# Patient Record
Sex: MALE | Race: WHITE | NOT HISPANIC OR LATINO | Employment: STUDENT | URBAN - METROPOLITAN AREA
[De-identification: names, ages, dates, MRNs, and addresses within clinical notes are randomized per-mention and may not be internally consistent; named-entity substitution may affect disease eponyms.]

---

## 2017-07-20 ENCOUNTER — OFFICE VISIT (OUTPATIENT)
Dept: GASTROENTEROLOGY | Facility: CLINIC | Age: 22
End: 2017-07-20
Payer: COMMERCIAL

## 2017-07-20 ENCOUNTER — TELEPHONE (OUTPATIENT)
Dept: ENDOSCOPY | Facility: HOSPITAL | Age: 22
End: 2017-07-20

## 2017-07-20 VITALS
SYSTOLIC BLOOD PRESSURE: 129 MMHG | HEIGHT: 68 IN | BODY MASS INDEX: 40.03 KG/M2 | WEIGHT: 264.13 LBS | HEART RATE: 77 BPM | DIASTOLIC BLOOD PRESSURE: 85 MMHG

## 2017-07-20 DIAGNOSIS — Z12.11 SPECIAL SCREENING FOR MALIGNANT NEOPLASMS, COLON: Primary | ICD-10-CM

## 2017-07-20 DIAGNOSIS — R10.9 ABDOMINAL CRAMPING: ICD-10-CM

## 2017-07-20 DIAGNOSIS — R10.32 LEFT LOWER QUADRANT PAIN: Primary | ICD-10-CM

## 2017-07-20 DIAGNOSIS — K21.9 GASTROESOPHAGEAL REFLUX DISEASE, ESOPHAGITIS PRESENCE NOT SPECIFIED: ICD-10-CM

## 2017-07-20 DIAGNOSIS — Z79.1 NSAID LONG-TERM USE: ICD-10-CM

## 2017-07-20 DIAGNOSIS — R19.5 LOOSE STOOLS: ICD-10-CM

## 2017-07-20 PROCEDURE — 99204 OFFICE O/P NEW MOD 45 MIN: CPT | Mod: S$GLB,,, | Performed by: NURSE PRACTITIONER

## 2017-07-20 PROCEDURE — 99999 PR PBB SHADOW E&M-EST. PATIENT-LVL III: CPT | Mod: PBBFAC,,, | Performed by: NURSE PRACTITIONER

## 2017-07-20 RX ORDER — POLYETHYLENE GLYCOL 3350, SODIUM SULFATE ANHYDROUS, SODIUM BICARBONATE, SODIUM CHLORIDE, POTASSIUM CHLORIDE 236; 22.74; 6.74; 5.86; 2.97 G/4L; G/4L; G/4L; G/4L; G/4L
4 POWDER, FOR SOLUTION ORAL ONCE
Qty: 4000 ML | Refills: 0 | Status: SHIPPED | OUTPATIENT
Start: 2017-07-20 | End: 2017-07-20

## 2017-07-20 NOTE — PROGRESS NOTES
[facility] Note    Reason for Visit:  The primary encounter diagnosis was Left lower quadrant pain. Diagnoses of Abdominal cramping, Loose stools, Gastroesophageal reflux disease, esophagitis presence not specified, and NSAID long-term use were also pertinent to this visit.    PCP:   Ingrid Lemus       Referring MD:  Aaareferral Self  No address on file    HPI:  This is a 21 y.o. male here for evaluation of abdominal pain and loose stools. Mr. Mcclain is new to the clinic.      Abdominal pain-   ONSET: x 3 months  LOCATION: LLQ  DURATION: intermittent, multiple times per week  CHARACTER: cramping  ASSOCIATED/ALLEVIATING/AGGRAVAITING: No nausea, vomiting, fever. Occasional worse with gluten and artificial sweeteners (hx of drinking 2-3 red bulls per week stopped past 4 weeks), Occasional improvement with BM  RADIATION: hx of back pain unsure if radiates  TEMPORAL: No association with time. Can last all day.  SEVERITY: 6/10    Has been having 1-2 loose stools with a normal formed stool daily x 3 months. Began 3 weeks before finals. Left for [place] a few weeks after change in bowel habits and back 6/22/17 took Imodium in [place] and relief. Currently, still having loose stools with normal formed stool daily. Rectal burning and sensitivity after eating spicy foods and loose stools. Denies hemorrhoids, blood in stool, black tarry stools, fecal incontinence, fam hx of IBD.    Hx of Gerd x years. Reflux symptoms of pyrosis are occurring once a week. Food triggers- acidic foods, coffee. Will take Tums- relief. Denies dysphagia/odynophagia. NSAID usage- 2-3 tabs Ibuprofen 2x/week. No prior hx of EGD.     ROS:  Constitutional: No fevers, no chills, No unintentional weight loss, no fatigue   ENT: No allergies  CV: No chest pain, no palpitations, no perif. edema  Pulm: No cough, No shortness of breath, no wheezes, no sputum  Ophtho: No vision changes  GI: see HPI  Derm: No rash  Heme: No lymphadenopathy,  "No bruising  MSK: No arthritis, no muscle pain, no muscle weakness  : No dysuria, No hematuria  Endo: No hot or cold intolerance  Neuro: No syncope, No seizure     Medical History:  has no past medical history on file.    Surgical History:  has no past surgical history on file.    Family History: family history includes Diabetes in his maternal grandmother, maternal uncle, and mother; Diverticulosis in his father..     Social History:  reports that he has quit smoking. He has never used smokeless tobacco.    Review of patient's allergies indicates:  No Known Allergies    No current outpatient prescriptions on file.     No current facility-administered medications for this visit.      Objective Findings:    Vital Signs:  /85   Pulse 77   Ht 5' 8" (1.727 m)   Wt 119.8 kg (264 lb 1.8 oz)   BMI 40.16 kg/m²   Body mass index is 40.16 kg/m².    Physical Exam:  General Appearance: Well appearing in no acute distress  Head: Normocephalic, without obvious abnormality  Eyes: No scleral icterus, EOMI  ENT: Neck supple, Lips, mucosa, and tongue normal; teeth and gums normal  Lungs: CTA bilaterally in anterior and posterior fields, no wheezes, no crackles.  Heart: Regular rate and rhythm, no murmurs heard  Abdomen: Soft, LLQ tenderness upon palpation, no rebound tenderness, non distended with positive bowel sounds in all four quadrants.  Extremities: No clubbing, cyanosis or edema  Skin: No rash to exposed areas  Neurologic: AAOx4    Labs:  No results found for: WBC, HGB, HCT, PLT, CHOL, TRIG, HDL, LDLDIRECT, ALT, AST, NA, K, CL, CREATININE, BUN, CO2, TSH, PSA, INR, GLUF, HGBA1C, MICROALBUR    Endoscopy:    EGD- none  Colonoscopy- none    Assessment:  1. Left lower quadrant pain    2. Abdominal cramping    3. Loose stools    4. Gastroesophageal reflux disease, esophagitis presence not specified    5. NSAID long-term use      Recommendations:  1. Schedule Abd US to rule out biliary etiology. Ordered labs- Lipase rule " out pancreatitis, CBC, CMP.   2. & 3. Schedule colonoscopy to rule out colitis. Ordered labs- TTG Iga and Iga rule out Celiac Disease, H. Pylori. Ordered stool studies to rule out infectious stool international travel- Giardia x3, O&P x3, stool cx.   4. & 5. Schedule EGD to rule out ulcer and esophagitis. Can take OTC Zantac or Prilosec. Reflux occur greater than 2x per week notify clinic and will begin daily PPI.     Follow up after EGD and Colonoscopy.     Order summary:  Orders Placed This Encounter    Stool culture    US Abdomen Complete    Lipase    CBC auto differential    Comprehensive metabolic panel    H. PYLORI ANTIBODY, IGG    TISSUE TRANSGLUTAMINASE (TTG), IGA    IGA    Giardia / Cryptosporidum, EIA    Giardia / Cryptosporidum, EIA    Giardia / Cryptosporidum, EIA    Stool Exam-Ova,Cysts,Parasites    Stool Exam-Ova,Cysts,Parasites    Stool Exam-Ova,Cysts,Parasites    Case request GI: COLONOSCOPY, ESOPHAGOGASTRODUODENOSCOPY (EGD)     Thank you so much for allowing me to participate in the care of Anderson MayfieldVIVIAN Garay, FNP-C

## 2017-07-25 ENCOUNTER — TELEPHONE (OUTPATIENT)
Dept: GASTROENTEROLOGY | Facility: CLINIC | Age: 22
End: 2017-07-25

## 2017-07-25 NOTE — TELEPHONE ENCOUNTER
Called pt did not answer left voicemail regarding labs and to turn in stool studies when available. Left number to call back.     INGRIS Caba

## 2017-07-26 ENCOUNTER — TELEPHONE (OUTPATIENT)
Dept: RADIOLOGY | Facility: HOSPITAL | Age: 22
End: 2017-07-26

## 2017-08-03 ENCOUNTER — TELEPHONE (OUTPATIENT)
Dept: RADIOLOGY | Facility: HOSPITAL | Age: 22
End: 2017-08-03

## 2017-08-04 ENCOUNTER — HOSPITAL ENCOUNTER (OUTPATIENT)
Dept: RADIOLOGY | Facility: HOSPITAL | Age: 22
Discharge: HOME | End: 2017-08-04
Attending: NURSE PRACTITIONER
Payer: COMMERCIAL

## 2017-08-04 DIAGNOSIS — R10.32 LEFT LOWER QUADRANT PAIN: ICD-10-CM

## 2017-08-04 PROCEDURE — 76700 US EXAM ABDOM COMPLETE: CPT | Mod: TC,PO

## 2017-08-04 PROCEDURE — 76700 US EXAM ABDOM COMPLETE: CPT | Mod: 26,,, | Performed by: RADIOLOGY

## 2017-08-07 ENCOUNTER — TELEPHONE (OUTPATIENT)
Dept: GASTROENTEROLOGY | Facility: CLINIC | Age: 22
End: 2017-08-07

## 2017-08-07 ENCOUNTER — DOCUMENTATION ONLY (OUTPATIENT)
Dept: TRANSPLANT | Facility: CLINIC | Age: 22
End: 2017-08-07

## 2017-08-07 DIAGNOSIS — K76.0 FATTY LIVER: Primary | ICD-10-CM

## 2017-08-07 NOTE — LETTER
August 7, 2017    Bethany You, CHARY  [address] [facility] [address]      Dear Dr. You    Patient: Anderson Mcclain   MR Number: [id number]   YOB: 1995     Thank you for the referral of Anderson Mcclain to the [facility]. An initial appointment will be scheduled for your patient with one of our Hepatologists.      Thank you again for your trust in our program.  If there is anything we can do for you or your staff, please feel free to contact us.        Sincerely,        [facility]  [facility] [place] [address]  [phone]

## 2017-08-07 NOTE — LETTER
August 7, 2017    Anderson Mcclain  [address] [place] [id number]      Dear Anderson Mcclain:    Your doctor has referred you to the [facility]. You will be contacted by our office and an initial appointment will then be scheduled for you.    We look forward to seeing you soon. If you have any further questions, please contact us at [phone].       Sincerely,        [facility]   [address] [place] [id number]  [phone]

## 2017-08-07 NOTE — TELEPHONE ENCOUNTER
----- Message from Bethany You NP sent at 8/7/2017 10:42 AM CDT -----  Spoke with Mr. Mcclain's mother put in order for hepatology referral for fatty liver. Mother said will schedule on portal. Was not sure if anything had to be done on our end. Thanks.     CHARY Sims

## 2017-08-07 NOTE — TELEPHONE ENCOUNTER
Called Mr. Mcclain's cell phone and mother answered phone verified okay to release information to her at this time. Explained abd US revealed fatty liver and liver enlargement. Explained stools studies have not been collected. Mother understood all info.     DICK CabaC

## 2017-08-07 NOTE — NURSING
Pt records reviewed.   Pt will be referred to Hepatology.  Fatty liver  Initial referral received  from the workque.   Referring Provider/diagnosis  Bethany You NP  Referral letter sent to provider and patient.

## 2017-08-14 ENCOUNTER — ANESTHESIA (OUTPATIENT)
Dept: ENDOSCOPY | Facility: HOSPITAL | Age: 22
End: 2017-08-14
Payer: COMMERCIAL

## 2017-08-14 ENCOUNTER — TELEPHONE (OUTPATIENT)
Dept: GASTROENTEROLOGY | Facility: CLINIC | Age: 22
End: 2017-08-14

## 2017-08-14 ENCOUNTER — ANESTHESIA EVENT (OUTPATIENT)
Dept: ENDOSCOPY | Facility: HOSPITAL | Age: 22
End: 2017-08-14
Payer: COMMERCIAL

## 2017-08-14 ENCOUNTER — HOSPITAL ENCOUNTER (OUTPATIENT)
Facility: HOSPITAL | Age: 22
Discharge: HOME | End: 2017-08-14
Attending: INTERNAL MEDICINE | Admitting: INTERNAL MEDICINE
Payer: COMMERCIAL

## 2017-08-14 VITALS
DIASTOLIC BLOOD PRESSURE: 61 MMHG | HEART RATE: 61 BPM | OXYGEN SATURATION: 99 % | HEIGHT: 68 IN | RESPIRATION RATE: 16 BRPM | WEIGHT: 250 LBS | BODY MASS INDEX: 37.89 KG/M2 | SYSTOLIC BLOOD PRESSURE: 105 MMHG | RESPIRATION RATE: 16 BRPM | TEMPERATURE: 99 F

## 2017-08-14 DIAGNOSIS — R19.7 DIARRHEA: ICD-10-CM

## 2017-08-14 DIAGNOSIS — R19.7 DIARRHEA, UNSPECIFIED TYPE: Primary | ICD-10-CM

## 2017-08-14 PROCEDURE — 88365 INSITU HYBRIDIZATION (FISH): CPT | Mod: 26,,, | Performed by: PATHOLOGY

## 2017-08-14 PROCEDURE — 45380 COLONOSCOPY AND BIOPSY: CPT | Mod: ,,, | Performed by: INTERNAL MEDICINE

## 2017-08-14 PROCEDURE — 27201012 HC FORCEPS, HOT/COLD, DISP: Performed by: INTERNAL MEDICINE

## 2017-08-14 PROCEDURE — 88305 TISSUE EXAM BY PATHOLOGIST: CPT | Mod: 26,,, | Performed by: PATHOLOGY

## 2017-08-14 PROCEDURE — 88341 IMHCHEM/IMCYTCHM EA ADD ANTB: CPT | Mod: 26,,, | Performed by: PATHOLOGY

## 2017-08-14 PROCEDURE — 45380 COLONOSCOPY AND BIOPSY: CPT | Performed by: INTERNAL MEDICINE

## 2017-08-14 PROCEDURE — 37000009 HC ANESTHESIA EA ADD 15 MINS: Performed by: INTERNAL MEDICINE

## 2017-08-14 PROCEDURE — 88305 TISSUE EXAM BY PATHOLOGIST: CPT | Performed by: PATHOLOGY

## 2017-08-14 PROCEDURE — 37000008 HC ANESTHESIA 1ST 15 MINUTES: Performed by: INTERNAL MEDICINE

## 2017-08-14 PROCEDURE — 43239 EGD BIOPSY SINGLE/MULTIPLE: CPT | Mod: 51,,, | Performed by: INTERNAL MEDICINE

## 2017-08-14 PROCEDURE — 88342 IMHCHEM/IMCYTCHM 1ST ANTB: CPT | Mod: 26,,, | Performed by: PATHOLOGY

## 2017-08-14 PROCEDURE — D9220A PRA ANESTHESIA: Mod: ANES,,, | Performed by: ANESTHESIOLOGY

## 2017-08-14 PROCEDURE — 25000003 PHARM REV CODE 250: Performed by: NURSE ANESTHETIST, CERTIFIED REGISTERED

## 2017-08-14 PROCEDURE — 88364 INSITU HYBRIDIZATION (FISH): CPT | Mod: 26,,, | Performed by: PATHOLOGY

## 2017-08-14 PROCEDURE — 63600175 PHARM REV CODE 636 W HCPCS: Performed by: NURSE ANESTHETIST, CERTIFIED REGISTERED

## 2017-08-14 PROCEDURE — 43239 EGD BIOPSY SINGLE/MULTIPLE: CPT | Performed by: INTERNAL MEDICINE

## 2017-08-14 PROCEDURE — D9220A PRA ANESTHESIA: Mod: CRNA,,, | Performed by: NURSE ANESTHETIST, CERTIFIED REGISTERED

## 2017-08-14 RX ORDER — PROPOFOL 10 MG/ML
INJECTION, EMULSION INTRAVENOUS
Status: DISCONTINUED | OUTPATIENT
Start: 2017-08-14 | End: 2017-08-14

## 2017-08-14 RX ORDER — LIDOCAINE HCL/PF 100 MG/5ML
SYRINGE (ML) INTRAVENOUS
Status: DISCONTINUED | OUTPATIENT
Start: 2017-08-14 | End: 2017-08-14

## 2017-08-14 RX ORDER — KETAMINE HCL IN 0.9 % NACL 50 MG/5 ML
SYRINGE (ML) INTRAVENOUS
Status: DISCONTINUED | OUTPATIENT
Start: 2017-08-14 | End: 2017-08-14

## 2017-08-14 RX ORDER — SODIUM CHLORIDE 9 MG/ML
INJECTION, SOLUTION INTRAVENOUS CONTINUOUS PRN
Status: DISCONTINUED | OUTPATIENT
Start: 2017-08-14 | End: 2017-08-14

## 2017-08-14 RX ORDER — SODIUM CHLORIDE 9 MG/ML
INJECTION, SOLUTION INTRAVENOUS CONTINUOUS
Status: DISCONTINUED | OUTPATIENT
Start: 2017-08-14 | End: 2017-08-14 | Stop reason: HOSPADM

## 2017-08-14 RX ORDER — PROPOFOL 10 MG/ML
VIAL (ML) INTRAVENOUS CONTINUOUS PRN
Status: DISCONTINUED | OUTPATIENT
Start: 2017-08-14 | End: 2017-08-14

## 2017-08-14 RX ORDER — MIDAZOLAM HYDROCHLORIDE 1 MG/ML
INJECTION, SOLUTION INTRAMUSCULAR; INTRAVENOUS
Status: DISCONTINUED | OUTPATIENT
Start: 2017-08-14 | End: 2017-08-14

## 2017-08-14 RX ADMIN — MIDAZOLAM HYDROCHLORIDE 2 MG: 1 INJECTION, SOLUTION INTRAMUSCULAR; INTRAVENOUS at 09:08

## 2017-08-14 RX ADMIN — PROPOFOL 200 MCG/KG/MIN: 10 INJECTION, EMULSION INTRAVENOUS at 09:08

## 2017-08-14 RX ADMIN — LIDOCAINE HYDROCHLORIDE 100 MG: 20 INJECTION, SOLUTION INTRAVENOUS at 09:08

## 2017-08-14 RX ADMIN — PROPOFOL 140 MG: 10 INJECTION, EMULSION INTRAVENOUS at 09:08

## 2017-08-14 RX ADMIN — SODIUM CHLORIDE: 0.9 INJECTION, SOLUTION INTRAVENOUS at 09:08

## 2017-08-14 RX ADMIN — Medication 25 MG: at 09:08

## 2017-08-14 NOTE — PATIENT INSTRUCTIONS
Discharge Summary/Instructions after an Endoscopic Procedure  Patient Name: Anderson Mcclain  Patient MRN: [id number]  Patient YOB: 1995 Monday, August 14, 2017  Tate Sexton MD  RESTRICTIONS ON ACTIVITY:  - DO NOT drive a car, operate machinery, make legal/financial decisions, or   drink alcohol until the day after the procedure.    - The following day: return to full activity including work, except no heavy   lifting, straining or running for 3 days if polyps were removed.  - Diet: Eat and drink normally unless instructed otherwise.  TREATMENT FOR COMMON SIDE EFFECTS:  - Mild abdominal pain, bloating or excessive gas: rest, eat lightly and use   a heating pad.  - Sore Throat - treat with throat lozenges. Gargle with warm salt water.  SYMPTOMS TO WATCH FOR AND REPORT TO YOUR PHYSICIAN:  1. Severe abdominal pain or bloating.  2. Pain in chest.  3. Chills or fever occurring within 24 hours after a procedure.  4. A large amount of rectal bleeding, which would show as bright red,   maroon, or black stools. (A small amount of blood from the rectum is not   serious, especially if hemorrhoids are present.)  5. Because air was used during the procedure, expelling large amounts of air   from your rectum or belching is normal.  6. If a bowel prep was taken, you may not have a bowel movement for 1-3   days.  This is normal.  7. Go directly to the emergency room if you notice any of the following:   Chills and/or fever over 101 F   Persistent vomiting   Severe abdominal pain, other than gas cramps   Severe chest pain   Black, tarry stools   Any bleeding - exceeding one tablespoon  Your doctor recommends these additional instructions:  If any biopsies were performed, my office will call you in 5 to 6 business   days with any results.  You are being discharged to home.   Resume your previous diet.   Continue your present medications.   We are waiting for your pathology results.   Return to your referring  physician.   You have a contact number available for emergencies.  The signs and symptoms   of potential delayed complications were discussed with you.  You may return   to normal activities tomorrow.  Written discharge instructions were   provided to you.  For questions, problems or results please call your physician - Tate Sexton MD at Work:  [phone].  [facility], EMERGENCY ROOM PHONE NUMBER: [phone]  IF A COMPLICATION OR EMERGENCY SITUATION ARISES AND YOU ARE UNABLE TO REACH   YOUR PHYSICIAN - GO TO THE EMERGENCY ROOM.  Tate Sexton MD  8/14/2017 9:51:11 AM  This report has been verified and signed electronically.

## 2017-08-14 NOTE — TELEPHONE ENCOUNTER
----- Message from Bethany You NP sent at 8/14/2017 10:25 AM CDT -----  Please schedule follow up in 2 weeks. Thanks.     Behtany

## 2017-08-14 NOTE — H&P
History & Physical - Short Stay  Gastroenterology      SUBJECTIVE:     Procedure: Colonoscopy and EGD    Chief Complaint/Indication for Procedure: diarrhea and nausea    History of Present Illness:  Patient is a 21 y.o. male with multiple GI sxs including diarrhea, nausea, abdominal pain.    No prescriptions prior to admission.       Review of patient's allergies indicates:  No Known Allergies     History reviewed. No pertinent past medical history.  History reviewed. No pertinent surgical history.  Family History   Problem Relation Age of Onset    Diabetes Mother     Diabetes Maternal Uncle     Diabetes Maternal Grandmother     Diverticulosis Father     Celiac disease Neg Hx     Colon cancer Neg Hx     Colon polyps Neg Hx     Esophageal cancer Neg Hx     Inflammatory bowel disease Neg Hx     Irritable bowel syndrome Neg Hx     Stomach cancer Neg Hx      Social History   Substance Use Topics    Smoking status: Current Some Day Smoker     Types: Cigars    Smokeless tobacco: Never Used    Alcohol use 1.2 oz/week     2 Cans of beer per week       Review of Systems:  Constitutional: no fever or chills  Gastrointestinal: no nausea or vomiting, no abdominal pain or change in bowel habits    OBJECTIVE:     Vital Signs (Most Recent)  Temp: 97.9 °F (36.6 °C) (08/14/17 0805)  Pulse: 91 (08/14/17 0805)  Resp: 16 (08/14/17 0805)  BP: 121/70 (08/14/17 0805)  SpO2: 98 % (08/14/17 0805)    Physical Exam:  General: well developed, well nourished  Abdomen: soft, non-tender non-distented; bowel sounds normal; no masses,  no organomegaly         ASSESSMENT/PLAN:     Patient is a 21 y.o. male with multiple GI sxs including diarrhea, nausea, abdominal pain.    Plan: Colonoscopy and EGD    Anesthesia Plan: Moderate Sedation    ASA Grade: ASA 2 - Patient with mild systemic disease with no functional limitations

## 2017-08-14 NOTE — ANESTHESIA POSTPROCEDURE EVALUATION
"Anesthesia Post Evaluation    Patient: Anderson Mcclain    Procedure(s) Performed: Procedure(s) (LRB):  ESOPHAGOGASTRODUODENOSCOPY (EGD) (N/A)  COLONOSCOPY (N/A)    Final Anesthesia Type: general  Patient location during evaluation: PACU  Patient participation: Yes- Able to Participate  Level of consciousness: awake and alert  Post-procedure vital signs: reviewed and stable  Pain management: adequate  Airway patency: patent  PONV status at discharge: No PONV  Anesthetic complications: no      Cardiovascular status: hemodynamically stable  Respiratory status: unassisted  Hydration status: euvolemic  Follow-up not needed.        Visit Vitals  BP (!) 103/51 (BP Location: Left arm, Patient Position: Lying)   Pulse 71   Temp 37.1 °C (98.7 °F) (Axillary)   Resp 16   Ht 5' 8" (1.727 m)   Wt 113.4 kg (250 lb)   SpO2 (!) 94%   BMI 38.01 kg/m²       Pain/Tim Score: Pain Assessment Performed: Yes (8/14/2017 10:20 AM)  Presence of Pain: denies (8/14/2017 10:20 AM)  Tim Score: 8 (8/14/2017 10:20 AM)      "

## 2017-08-14 NOTE — PATIENT INSTRUCTIONS
Discharge Summary/Instructions after an Endoscopic Procedure  Patient Name: Anderson Mcclain  Patient MRN: [id number]  Patient YOB: 1995 Monday, August 14, 2017  Tate Sexton MD  RESTRICTIONS ON ACTIVITY:  - DO NOT drive a car, operate machinery, make legal/financial decisions, or   drink alcohol until the day after the procedure.    - The following day: return to full activity including work, except no heavy   lifting, straining or running for 3 days if polyps were removed.  - Diet: Eat and drink normally unless instructed otherwise.  TREATMENT FOR COMMON SIDE EFFECTS:  - Mild abdominal pain, bloating or excessive gas: rest, eat lightly and use   a heating pad.  - Sore Throat - treat with throat lozenges. Gargle with warm salt water.  SYMPTOMS TO WATCH FOR AND REPORT TO YOUR PHYSICIAN:  1. Severe abdominal pain or bloating.  2. Pain in chest.  3. Chills or fever occurring within 24 hours after a procedure.  4. A large amount of rectal bleeding, which would show as bright red,   maroon, or black stools. (A small amount of blood from the rectum is not   serious, especially if hemorrhoids are present.)  5. Because air was used during the procedure, expelling large amounts of air   from your rectum or belching is normal.  6. If a bowel prep was taken, you may not have a bowel movement for 1-3   days.  This is normal.  7. Go directly to the emergency room if you notice any of the following:   Chills and/or fever over 101 F   Persistent vomiting   Severe abdominal pain, other than gas cramps   Severe chest pain   Black, tarry stools   Any bleeding - exceeding one tablespoon  Your doctor recommends these additional instructions:  If any biopsies were performed, my office will call you in 5 to 6 business   days with any results.  You are being discharged to home.   Resume your previous diet.   Continue your present medications.   We are waiting for your pathology results.   Return to your referring  physician.   You have a contact number available for emergencies.  The signs and symptoms   of potential delayed complications were discussed with you.  You may return   to normal activities tomorrow.  Written discharge instructions were   provided to you.  For questions, problems or results please call your physician - Tate Sexton MD at Work:  [phone].  [facility], EMERGENCY ROOM PHONE NUMBER: [phone]  IF A COMPLICATION OR EMERGENCY SITUATION ARISES AND YOU ARE UNABLE TO REACH   YOUR PHYSICIAN - GO TO THE EMERGENCY ROOM.  Tate Sexton MD  8/14/2017 10:32:24 AM  This report has been verified and signed electronically.

## 2017-08-14 NOTE — TRANSFER OF CARE
"Anesthesia Transfer of Care Note    Patient: Anderson Mcclain    Procedure(s) Performed: Procedure(s) (LRB):  ESOPHAGOGASTRODUODENOSCOPY (EGD) (N/A)  COLONOSCOPY (N/A)    Patient location: PACU    Anesthesia Type: general    Transport from OR: Transported from OR on 2-3 L/min O2 by NC with adequate spontaneous ventilation    Post pain: adequate analgesia    Post assessment: no apparent anesthetic complications and tolerated procedure well    Post vital signs: stable    Level of consciousness: sedated and responds to stimulation    Nausea/Vomiting: no nausea/vomiting    Complications: none    Transfer of care protocol was followed      Last vitals:   Visit Vitals  /70 (BP Location: Left arm, Patient Position: Lying)   Pulse 91   Temp 36.6 °C (97.9 °F) (Oral)   Resp 16   Ht 5' 8" (1.727 m)   Wt 113.4 kg (250 lb)   SpO2 98%   BMI 38.01 kg/m²     "

## 2017-08-14 NOTE — DISCHARGE INSTRUCTIONS

## 2017-08-16 ENCOUNTER — OFFICE VISIT (OUTPATIENT)
Dept: HEPATOLOGY | Facility: CLINIC | Age: 22
End: 2017-08-16
Payer: COMMERCIAL

## 2017-08-16 ENCOUNTER — PROCEDURE VISIT (OUTPATIENT)
Dept: HEPATOLOGY | Facility: CLINIC | Age: 22
End: 2017-08-16
Attending: PHYSICIAN ASSISTANT
Payer: COMMERCIAL

## 2017-08-16 VITALS
OXYGEN SATURATION: 97 % | HEART RATE: 72 BPM | BODY MASS INDEX: 38.92 KG/M2 | HEIGHT: 68 IN | RESPIRATION RATE: 18 BRPM | SYSTOLIC BLOOD PRESSURE: 132 MMHG | TEMPERATURE: 98 F | DIASTOLIC BLOOD PRESSURE: 79 MMHG | WEIGHT: 256.81 LBS

## 2017-08-16 DIAGNOSIS — K76.0 HEPATIC STEATOSIS: Primary | ICD-10-CM

## 2017-08-16 DIAGNOSIS — K76.0 HEPATIC STEATOSIS: ICD-10-CM

## 2017-08-16 PROCEDURE — 3008F BODY MASS INDEX DOCD: CPT | Mod: S$GLB,,, | Performed by: PHYSICIAN ASSISTANT

## 2017-08-16 PROCEDURE — 99999 PR PBB SHADOW E&M-EST. PATIENT-LVL III: CPT | Mod: PBBFAC,,, | Performed by: PHYSICIAN ASSISTANT

## 2017-08-16 PROCEDURE — 91200 LIVER ELASTOGRAPHY: CPT | Mod: S$GLB,,, | Performed by: PHYSICIAN ASSISTANT

## 2017-08-16 PROCEDURE — 99204 OFFICE O/P NEW MOD 45 MIN: CPT | Mod: S$GLB,,, | Performed by: PHYSICIAN ASSISTANT

## 2017-08-16 NOTE — PROGRESS NOTES
HEPATOLOGY CLINIC VISIT NOTE     REFERRING PROVIDER: Bethany You NP    REASON FOR VISIT: fatty liver    HISTORY: This is a 21 y.o. White male here for evaluation of fatty liver noted during his LLQ pain work up. U/S revealed hepatosplenomegaly and hepatic steatosis. Labs reveal normal transaminases, normal LFTs, PLTs are >150. He has not had formal liver evaluation. He reports that his mother has NAFLD as well. He does not have any significant PMH. He recently had EGD/colonoscopy done- path is pending. He denies heavy alcohol use. His BMI is 39, he does endorse a diet high in process foods. Denies jaundice, dark urine, hematemesis, melena, slowed mentation, abdominal distention.       Liver staging:  No formal staging  Normal transaminases  Normal liver function                   No past medical history on file.      Past Surgical History:   Procedure Laterality Date    COLONOSCOPY N/A 8/14/2017    Procedure: COLONOSCOPY;  Surgeon: Tate Sexton MD;  Location: [address]);  Service: Endoscopy;  Laterality: N/A;     FAMILY HISTORY:   Mother with NAFLD    SOCIAL HISTORY:   History   Smoking Status    Current Some Day Smoker    Types: Cigars   Smokeless Tobacco    Never Used       History   Alcohol Use    1.2 oz/week    2 Cans of beer per week       History   Drug Use No       ROS:   No fever, chills, weight loss, fatigue  No chest pain, cough  No abdominal pain, nausea, vomiting  No lower extremity edema  No depression or anxiety      PHYSICAL EXAM:  Friendly White male, in no acute distress; alert and oriented to person, place and time  VITALS: reviewed  HEENT: Sclerae anicteric.   NECK: Supple  CVS: Regular rate and rhythm. No murmurs  LUNGS: Normal respiratory effort. Clear bilaterally  ABDOMEN: Flat, soft, nontender. No organomegaly or masses. No ascites or hernias. Good bowel sounds.    SKIN: Warm and dry. No jaundice, No obvious rashes.   EXTREMITIES: No lower extremity  edema  NEURO/PSYCH: Normal gate. Memory intact. Thought and speech pattern appropriate. Behavior normal. No depression or anxiety noted.    RECENT LABS:  Lab Results   Component Value Date    WBC 9.07 07/20/2017    HGB 13.8 (L) 07/20/2017     07/20/2017     No results found for: INR  Lab Results   Component Value Date    AST 20 07/20/2017    ALT 22 07/20/2017    BILITOT 0.8 07/20/2017    ALBUMIN 3.8 07/20/2017    ALKPHOS 57 07/20/2017    CREATININE 1.0 07/20/2017    BUN 14 07/20/2017     07/20/2017    K 4.2 07/20/2017       RECENT IMAGING:  U/S abdomen 08/2017  Narrative     Complete abdominal ultrasound.    Findings: The liver is enlarged measuring 17.7 cm in craniocaudal length.  There is mild coarsening and increased echogenicity of the hepatic parenchyma suggesting fatty infiltration.  No focal liver lesions are identified.    Gallbladder is unremarkable without intraluminal stones or sludge identified.  Common bile duct is normal in caliber measuring 2.6 mm.    The spleen is enlarged measuring 14.7 cm in length.    Pancreas is unremarkable.    Kidneys are unremarkable.    Abdominal aorta and IVC are unremarkable.    No ascites.   Impression     1.  Hepatosplenomegaly.    2.  Fatty liver.       ASSESSMENT  21 y.o. White male with:  1. HEPATIC STEATOSIS  -- normal transaminases, given this, I do not feel a full serological work up is needed  -- will assess fibrosis with fibroscan given hepatosplenomegaly   -- discussed dietary changes and weight loss  _____________  Post visit fibroscan noted no fibrosis, will follow up PRN.     EDUCATION:  We discussed the manifestations of NAFLD. At this time there is no FDA approved therapy for NAFLD.   The patient and I discussed the importance of diet, exercise, and weight loss for management of NAFLD. We discussed a low fat, low carb/low sugar, high fiber diet, and a goal of 30 minutes of exercise 5 times per week.   Pt is aware that fatty liver can progress to  steatohepatitis and possibly to cirrhosis, putting one at increased risk for liver cancer, liver failure, and death.       PLAN:  1. Fibroscan  2. Follow up PRN    Thank you for allowing me to participate in the care of Anderson Mcclain  Prince Herr PA-C

## 2017-08-16 NOTE — LETTER
August 16, 2017      Bethany You, NP  [address] [facility] [address]           [facility] - Hepatology  [address]  [facility] [address]  Phone: [phone]  Fax: [phone]          Patient: Anderson Mcclain   MR Number: [id number]   YOB: 1995   Date of Visit: 8/16/2017       Dear Bethany You:    Thank you for referring Anderson Mcclain to me for evaluation. Attached you will find relevant portions of my assessment and plan of care.    If you have questions, please do not hesitate to call me. I look forward to following Anderson Mcclain along with you.    Sincerely,    Prince Herr PA-C    Enclosure  CC:  No Recipients    If you would like to receive this communication electronically, please contact [email or web address] or [phone] to request more information on [employer] access.    For providers and/or their staff who would like to refer a patient to [facility], please contact us through our one-stop-shop provider referral line, [facility], at [phone].    If you feel you have received this communication in error or would no longer like to receive these types of communications, please e-mail [email or web address]

## 2017-08-16 NOTE — PROGRESS NOTES
I have reviewed and concur with the ARNIE's history, physical, assessment, and plan.  I have personally interviewed and examined the patient at bedside.  See below addendum for my evaluation and additional findings.    22yo male with incidentally noted hepatosplenomegaly and normal liver enzymes.  Suspect patient with benign NAFLD given obesity.  Discussed importance of healthy lifestyle and increased physical activity.  Patient reports mother with fatty liver disease.  Recommend fibroscan to rule out advanced fibrosis given the presence of splenomegaly but if normal patient does not require long term hepatology follow-up and should continue lifestyle changes with annual check of liver tests.     Patient will return to clinic prn

## 2017-08-17 NOTE — PROCEDURES
Procedures   Fibroscan Procedure     Name: Anderson Mcclain  Date of Procedure : 2017   :: Prince Herr PA-C  Diagnosis: NAFLD    Probe: XL    Fibroscan reading: 3.8 KPa    Fibrosis:F 0-1     CAP readin dB/m    Steatosis: :S3

## 2017-08-21 ENCOUNTER — TELEPHONE (OUTPATIENT)
Dept: ENDOSCOPY | Facility: HOSPITAL | Age: 22
End: 2017-08-21

## 2017-08-25 ENCOUNTER — TELEPHONE (OUTPATIENT)
Dept: GASTROENTEROLOGY | Facility: CLINIC | Age: 22
End: 2017-08-25

## 2017-08-25 NOTE — TELEPHONE ENCOUNTER
Result Notes     Notes Recorded by Tate Sexton MD on 8/24/2017 at 11:52 AM CDT  Mild inflammation of stomach and esophagus without infection so no antibiotics are needed.   The terminal ilium biopsies shows lymphoid tissue which is benign as discussed with the patient after the procedure.   No need for f/up endoscopy at this point.     Patient informed.

## 2018-05-02 ENCOUNTER — LAB VISIT (OUTPATIENT)
Dept: LAB | Facility: HOSPITAL | Age: 23
End: 2018-05-02
Payer: COMMERCIAL

## 2018-05-02 ENCOUNTER — OFFICE VISIT (OUTPATIENT)
Dept: FAMILY MEDICINE | Facility: CLINIC | Age: 23
End: 2018-05-02
Payer: COMMERCIAL

## 2018-05-02 VITALS
DIASTOLIC BLOOD PRESSURE: 84 MMHG | SYSTOLIC BLOOD PRESSURE: 122 MMHG | TEMPERATURE: 99 F | HEART RATE: 99 BPM | HEIGHT: 68 IN | RESPIRATION RATE: 18 BRPM | OXYGEN SATURATION: 98 % | WEIGHT: 255.63 LBS | BODY MASS INDEX: 38.74 KG/M2

## 2018-05-02 DIAGNOSIS — Z00.00 ROUTINE ADULT HEALTH MAINTENANCE: ICD-10-CM

## 2018-05-02 DIAGNOSIS — Z00.00 ROUTINE ADULT HEALTH MAINTENANCE: Primary | ICD-10-CM

## 2018-05-02 DIAGNOSIS — M54.6 LEFT-SIDED THORACIC BACK PAIN, UNSPECIFIED CHRONICITY: ICD-10-CM

## 2018-05-02 DIAGNOSIS — M54.2 NECK PAIN: ICD-10-CM

## 2018-05-02 DIAGNOSIS — F41.9 ANXIETY: ICD-10-CM

## 2018-05-02 DIAGNOSIS — R25.3 EYELID TWITCH: ICD-10-CM

## 2018-05-02 DIAGNOSIS — R51.9 NONINTRACTABLE HEADACHE, UNSPECIFIED CHRONICITY PATTERN, UNSPECIFIED HEADACHE TYPE: ICD-10-CM

## 2018-05-02 LAB
ALBUMIN SERPL BCP-MCNC: 4 G/DL
ALP SERPL-CCNC: 63 U/L
ALT SERPL W/O P-5'-P-CCNC: 19 U/L
ANION GAP SERPL CALC-SCNC: 9 MMOL/L
AST SERPL-CCNC: 18 U/L
BASOPHILS # BLD AUTO: 0.07 K/UL
BASOPHILS NFR BLD: 0.7 %
BILIRUB SERPL-MCNC: 0.3 MG/DL
BUN SERPL-MCNC: 14 MG/DL
CALCIUM SERPL-MCNC: 9.9 MG/DL
CHLORIDE SERPL-SCNC: 105 MMOL/L
CHOLEST SERPL-MCNC: 180 MG/DL
CHOLEST/HDLC SERPL: 3.8 {RATIO}
CO2 SERPL-SCNC: 26 MMOL/L
CREAT SERPL-MCNC: 0.9 MG/DL
DIFFERENTIAL METHOD: ABNORMAL
EOSINOPHIL # BLD AUTO: 0.2 K/UL
EOSINOPHIL NFR BLD: 2.1 %
ERYTHROCYTE [DISTWIDTH] IN BLOOD BY AUTOMATED COUNT: 13.5 %
EST. GFR  (AFRICAN AMERICAN): >60 ML/MIN/1.73 M^2
EST. GFR  (NON AFRICAN AMERICAN): >60 ML/MIN/1.73 M^2
ESTIMATED AVG GLUCOSE: 103 MG/DL
GLUCOSE SERPL-MCNC: 82 MG/DL
HBA1C MFR BLD HPLC: 5.2 %
HCT VFR BLD AUTO: 46.2 %
HDLC SERPL-MCNC: 47 MG/DL
HDLC SERPL: 26.1 %
HGB BLD-MCNC: 14.5 G/DL
IMM GRANULOCYTES # BLD AUTO: 0.05 K/UL
IMM GRANULOCYTES NFR BLD AUTO: 0.5 %
LDLC SERPL CALC-MCNC: 101 MG/DL
LYMPHOCYTES # BLD AUTO: 3.1 K/UL
LYMPHOCYTES NFR BLD: 32.1 %
MCH RBC QN AUTO: 28.3 PG
MCHC RBC AUTO-ENTMCNC: 31.4 G/DL
MCV RBC AUTO: 90 FL
MONOCYTES # BLD AUTO: 0.7 K/UL
MONOCYTES NFR BLD: 7 %
NEUTROPHILS # BLD AUTO: 5.6 K/UL
NEUTROPHILS NFR BLD: 57.6 %
NONHDLC SERPL-MCNC: 133 MG/DL
NRBC BLD-RTO: 0 /100 WBC
PLATELET # BLD AUTO: 298 K/UL
PMV BLD AUTO: 11.3 FL
POTASSIUM SERPL-SCNC: 4.7 MMOL/L
PROT SERPL-MCNC: 7.7 G/DL
RBC # BLD AUTO: 5.12 M/UL
SODIUM SERPL-SCNC: 140 MMOL/L
TRIGL SERPL-MCNC: 160 MG/DL
TSH SERPL DL<=0.005 MIU/L-ACNC: 1.75 UIU/ML
WBC # BLD AUTO: 9.72 K/UL

## 2018-05-02 PROCEDURE — 84443 ASSAY THYROID STIM HORMONE: CPT

## 2018-05-02 PROCEDURE — 83036 HEMOGLOBIN GLYCOSYLATED A1C: CPT

## 2018-05-02 PROCEDURE — 85025 COMPLETE CBC W/AUTO DIFF WBC: CPT

## 2018-05-02 PROCEDURE — 80061 LIPID PANEL: CPT

## 2018-05-02 PROCEDURE — 36415 COLL VENOUS BLD VENIPUNCTURE: CPT | Mod: PO

## 2018-05-02 PROCEDURE — 80053 COMPREHEN METABOLIC PANEL: CPT

## 2018-05-02 PROCEDURE — 99999 PR PBB SHADOW E&M-EST. PATIENT-LVL IV: CPT | Mod: PBBFAC,,, | Performed by: INTERNAL MEDICINE

## 2018-05-02 PROCEDURE — 99385 PREV VISIT NEW AGE 18-39: CPT | Mod: S$GLB,,, | Performed by: INTERNAL MEDICINE

## 2018-05-02 RX ORDER — MELOXICAM 15 MG/1
15 TABLET ORAL DAILY
Qty: 30 TABLET | Refills: 0 | Status: SHIPPED | OUTPATIENT
Start: 2018-05-02 | End: 2021-05-17

## 2018-05-02 NOTE — PROGRESS NOTES
Subjective:       Patient ID: Anderson Mcclain is a 22 y.o. male.    Chief Complaint: Establish Care    -est care-------      History reviewed. No pertinent past medical history.  Past Surgical History:   Procedure Laterality Date    COLONOSCOPY N/A 8/14/2017    Procedure: COLONOSCOPY;  Surgeon: Tate Sexton MD;  Location: [address];  Service: Endoscopy;  Laterality: N/A;    WISDOM TOOTH EXTRACTION       Family History   Problem Relation Age of Onset    Diabetes Mother     Diabetes Maternal Uncle     Diabetes Maternal Grandmother     Diverticulosis Father     Celiac disease Neg Hx     Colon cancer Neg Hx     Colon polyps Neg Hx     Esophageal cancer Neg Hx     Inflammatory bowel disease Neg Hx     Irritable bowel syndrome Neg Hx     Stomach cancer Neg Hx      Social History     Social History    Marital status: Single     Spouse name: N/A    Number of children: N/A    Years of education: N/A     Occupational History    Not on file.     Social History Main Topics    Smoking status: Current Some Day Smoker     Types: Cigars    Smokeless tobacco: Never Used    Alcohol use 1.2 oz/week     2 Cans of beer per week    Drug use: No    Sexual activity: Not on file     Other Topics Concern    Not on file     Social History Narrative    No narrative on file     Review of Systems   Constitutional: Negative for activity change, appetite change, chills, diaphoresis, fatigue, fever and unexpected weight change.   HENT: Negative for drooling, ear discharge, ear pain, facial swelling, hearing loss, mouth sores, nosebleeds, postnasal drip, rhinorrhea, sinus pressure, sneezing, sore throat, tinnitus, trouble swallowing and voice change.    Eyes: Negative for photophobia, redness and visual disturbance.        Eyelid twitching.   Respiratory: Negative for apnea, cough, choking, chest tightness, shortness of breath and wheezing.    Cardiovascular: Negative for chest pain, palpitations and leg swelling.    Gastrointestinal: Negative for abdominal distention, abdominal pain, anal bleeding, blood in stool, constipation, diarrhea, nausea and vomiting.   Endocrine: Negative for cold intolerance, heat intolerance, polydipsia, polyphagia and polyuria.   Genitourinary: Negative for difficulty urinating, dysuria, enuresis, flank pain, frequency, genital sores, hematuria and urgency.   Musculoskeletal: Positive for back pain, neck pain and neck stiffness. Negative for arthralgias, gait problem, joint swelling and myalgias.   Skin: Negative for color change, pallor, rash and wound.   Allergic/Immunologic: Negative for food allergies and immunocompromised state.   Neurological: Positive for headaches. Negative for dizziness, tremors, seizures, syncope, facial asymmetry, speech difficulty, weakness, light-headedness and numbness.   Hematological: Negative for adenopathy. Does not bruise/bleed easily.   Psychiatric/Behavioral: Negative for agitation, behavioral problems, confusion, decreased concentration, dysphoric mood, hallucinations, self-injury, sleep disturbance and suicidal ideas. The patient is nervous/anxious. The patient is not hyperactive.        Objective:      Physical Exam   Constitutional: He is oriented to person, place, and time. He appears well-developed and well-nourished. No distress.   HENT:   Head: Normocephalic and atraumatic.   Eyes: Pupils are equal, round, and reactive to light.   Neck: Normal range of motion. Neck supple. No JVD present. Carotid bruit is not present. No tracheal deviation present. No thyromegaly present.   Cardiovascular: Normal rate, regular rhythm, normal heart sounds and intact distal pulses.    Pulmonary/Chest: Effort normal and breath sounds normal. No respiratory distress. He has no wheezes. He has no rales. He exhibits no tenderness.   Abdominal: Soft. Bowel sounds are normal. He exhibits no distension. There is no tenderness. There is no rebound and no guarding.    Musculoskeletal: Normal range of motion. He exhibits tenderness. He exhibits no edema.   Lymphadenopathy:     He has no cervical adenopathy.   Neurological: He is alert and oriented to person, place, and time. No cranial nerve deficit. Coordination normal.   Skin: Skin is warm and dry. No rash noted. He is not diaphoretic. No erythema. No pallor.   Psychiatric: He has a normal mood and affect. His behavior is normal. Judgment and thought content normal.   Nursing note and vitals reviewed.      CMP  Sodium   Date Value Ref Range Status   07/20/2017 136 136 - 145 mmol/L Final     Potassium   Date Value Ref Range Status   07/20/2017 4.2 3.5 - 5.1 mmol/L Final     Chloride   Date Value Ref Range Status   07/20/2017 102 95 - 110 mmol/L Final     CO2   Date Value Ref Range Status   07/20/2017 27 23 - 29 mmol/L Final     Glucose   Date Value Ref Range Status   07/20/2017 96 70 - 110 mg/dL Final     BUN, Bld   Date Value Ref Range Status   07/20/2017 14 6 - 20 mg/dL Final     Creatinine   Date Value Ref Range Status   07/20/2017 1.0 0.5 - 1.4 mg/dL Final     Calcium   Date Value Ref Range Status   07/20/2017 9.4 8.7 - 10.5 mg/dL Final     Total Protein   Date Value Ref Range Status   07/20/2017 7.5 6.0 - 8.4 g/dL Final     Albumin   Date Value Ref Range Status   07/20/2017 3.8 3.5 - 5.2 g/dL Final     Total Bilirubin   Date Value Ref Range Status   07/20/2017 0.8 0.1 - 1.0 mg/dL Final     Comment:     For infants and newborns, interpretation of results should be based  on gestational age, weight and in agreement with clinical  observations.  Premature Infant recommended reference ranges:  Up to 24 hours.............<8.0 mg/dL  Up to 48 hours............<12.0 mg/dL  3-5 days..................<15.0 mg/dL  6-29 days.................<15.0 mg/dL       Alkaline Phosphatase   Date Value Ref Range Status   07/20/2017 57 55 - 135 U/L Final     AST   Date Value Ref Range Status   07/20/2017 20 10 - 40 U/L Final     ALT   Date Value  Ref Range Status   07/20/2017 22 10 - 44 U/L Final     Anion Gap   Date Value Ref Range Status   07/20/2017 7 (L) 8 - 16 mmol/L Final     eGFR if    Date Value Ref Range Status   07/20/2017 >60.0 >60 mL/min/1.73 m^2 Final     eGFR if non    Date Value Ref Range Status   07/20/2017 >60.0 >60 mL/min/1.73 m^2 Final     Comment:     Calculation used to obtain the estimated glomerular filtration  rate (eGFR) is the CKD-EPI equation. Since race is unknown   in our information system, the eGFR values for   -American and Non--American patients are given   for each creatinine result.       Lab Results   Component Value Date    WBC 9.07 07/20/2017    HGB 13.8 (L) 07/20/2017    HCT 42.0 07/20/2017    MCV 85 07/20/2017     07/20/2017     No results found for: CHOL  No results found for: HDL  No results found for: LDLCALC  No results found for: TRIG  No results found for: CHOLHDL  No results found for: TSH  No results found for: LABA1C, HGBA1C  Assessment:       1. Routine adult health maintenance    2. Left-sided thoracic back pain, unspecified chronicity    3. Neck pain    4. Nonintractable headache, unspecified chronicity pattern, unspecified headache type    5. Anxiety    6. Eyelid twitch        Plan:   Routine adult health maintenance  -     Comprehensive metabolic panel; Future; Expected date: 05/02/2018  -     CBC auto differential; Future; Expected date: 05/02/2018  -     Lipid panel; Future; Expected date: 05/02/2018  -     TSH; Future; Expected date: 05/02/2018  -     Hemoglobin A1c; Future; Expected date: 05/02/2018    Left-sided thoracic back pain, unspecified chronicity  -     meloxicam (MOBIC) 15 MG tablet; Take 1 tablet (15 mg total) by mouth once daily.  Dispense: 30 tablet; Refill: 0    Neck pain  -     meloxicam (MOBIC) 15 MG tablet; Take 1 tablet (15 mg total) by mouth once daily.  Dispense: 30 tablet; Refill: 0    Nonintractable headache, unspecified  chronicity pattern, unspecified headache type  -     Ambulatory referral to Neurology  -     meloxicam (MOBIC) 15 MG tablet; Take 1 tablet (15 mg total) by mouth once daily.  Dispense: 30 tablet; Refill: 0    Anxiety    Eyelid twitch  -     Ambulatory referral to Neurology                F/u 6 months.

## 2018-05-14 ENCOUNTER — LAB VISIT (OUTPATIENT)
Dept: LAB | Facility: HOSPITAL | Age: 23
End: 2018-05-14
Attending: PSYCHIATRY & NEUROLOGY
Payer: COMMERCIAL

## 2018-05-14 ENCOUNTER — OFFICE VISIT (OUTPATIENT)
Dept: NEUROLOGY | Facility: CLINIC | Age: 23
End: 2018-05-14
Payer: COMMERCIAL

## 2018-05-14 VITALS
BODY MASS INDEX: 38.86 KG/M2 | SYSTOLIC BLOOD PRESSURE: 118 MMHG | DIASTOLIC BLOOD PRESSURE: 74 MMHG | HEIGHT: 68 IN | WEIGHT: 256.38 LBS | HEART RATE: 79 BPM

## 2018-05-14 DIAGNOSIS — R29.818 NEUROLOGICAL DEFICIT PRESENT: ICD-10-CM

## 2018-05-14 DIAGNOSIS — R51.9 NONINTRACTABLE HEADACHE, UNSPECIFIED CHRONICITY PATTERN, UNSPECIFIED HEADACHE TYPE: ICD-10-CM

## 2018-05-14 DIAGNOSIS — R51.9 NONINTRACTABLE HEADACHE, UNSPECIFIED CHRONICITY PATTERN, UNSPECIFIED HEADACHE TYPE: Primary | ICD-10-CM

## 2018-05-14 LAB
25(OH)D3+25(OH)D2 SERPL-MCNC: 18 NG/ML
VIT B12 SERPL-MCNC: 624 PG/ML

## 2018-05-14 PROCEDURE — 84425 ASSAY OF VITAMIN B-1: CPT

## 2018-05-14 PROCEDURE — 84252 ASSAY OF VITAMIN B-2: CPT

## 2018-05-14 PROCEDURE — 36415 COLL VENOUS BLD VENIPUNCTURE: CPT

## 2018-05-14 PROCEDURE — 99205 OFFICE O/P NEW HI 60 MIN: CPT | Mod: S$GLB,,, | Performed by: PSYCHIATRY & NEUROLOGY

## 2018-05-14 PROCEDURE — 82306 VITAMIN D 25 HYDROXY: CPT

## 2018-05-14 PROCEDURE — 84207 ASSAY OF VITAMIN B-6: CPT

## 2018-05-14 PROCEDURE — 99999 PR PBB SHADOW E&M-EST. PATIENT-LVL III: CPT | Mod: PBBFAC,,, | Performed by: PSYCHIATRY & NEUROLOGY

## 2018-05-14 PROCEDURE — 3008F BODY MASS INDEX DOCD: CPT | Mod: CPTII,S$GLB,, | Performed by: PSYCHIATRY & NEUROLOGY

## 2018-05-14 PROCEDURE — 82607 VITAMIN B-12: CPT

## 2018-05-14 NOTE — PROGRESS NOTES
[facility] - NEUROLOGY  [facility] [place]    Date: May 14, 2018   Patient Name: Anderson Mcclain   MRN: [id number]   PCP: Abel Bland  Referring Provider: Self, Aaareferral    Assessment:      This is Andreson Mcclain, 22 y.o. male with new headache and unilateral sensory alteration.  We discussed that many of symptoms may be related to underlying stressors although this should be diagnosis of exclusion.     Plan:      -  MRI brain with and without  -  Vitamin levels  -  Reviewed sleep hygeine    Will call with results       I discussed side effects of the medications. I asked the patient to stop the medication if she notices serious adverse effects as we discussed and to seek immediate medical attention at an ER.     Jong Lawler MD  [facility]   Department of Neurology    Subjective:      HPI:   Mr. Anderson Mcclain is a 22 y.o. male who presents with several issues    Patient developed mid back pain about 2015 which gradually progressed down into the low back and later into the hip, he saw PMR and was diagnosed with piriformis syndrome.  Somewhat after development of back pain he began to have left distal leg numbness and paresthesias which gradually progressed to left arm and more recently to left face.  He describes 1-2 tension headaches per week with some recent worsening related to college finals, also has intermittent sinus headaches and seasonal allergies.  He notes intermittent left eye watering and drooping eyelid as well as left eyelid twitching that does not occur in association with headache.  Endorses some difficulty with balance and mild difficulty with left hand fine motor control.    He just completed his second year at [employer] with a major in .  He notes recent poor sleep and lack of exercise with recent stressor of father passing away 12/2017 and returning to school shortly after.  Currently enrolled in summer courses.    PAST MEDICAL  "HISTORY:  No past medical history on file.    PAST SURGICAL HISTORY:  Past Surgical History:   Procedure Laterality Date    COLONOSCOPY N/A 8/14/2017    Procedure: COLONOSCOPY;  Surgeon: Tate Sexton MD;  Location: [address];  Service: Endoscopy;  Laterality: N/A;    WISDOM TOOTH EXTRACTION       CURRENT MEDS:  Current Outpatient Prescriptions   Medication Sig Dispense Refill    meloxicam (MOBIC) 15 MG tablet Take 1 tablet (15 mg total) by mouth once daily. 30 tablet 0     No current facility-administered medications for this visit.      ALLERGIES:  Review of patient's allergies indicates:  No Known Allergies    FAMILY HISTORY:  Family History   Problem Relation Age of Onset    Diabetes Mother     Diabetes Maternal Uncle     Diabetes Maternal Grandmother     Diverticulosis Father     Celiac disease Neg Hx     Colon cancer Neg Hx     Colon polyps Neg Hx     Esophageal cancer Neg Hx     Inflammatory bowel disease Neg Hx     Irritable bowel syndrome Neg Hx     Stomach cancer Neg Hx        SOCIAL HISTORY:  Social History   Substance Use Topics    Smoking status: Current Some Day Smoker     Types: Cigars    Smokeless tobacco: Never Used    Alcohol use 1.2 oz/week     2 Cans of beer per week       Review of Systems:  12 review of systems is negative except for the symptoms mentioned in HPI.        Objective:     Vitals:    05/14/18 1412   BP: 118/74   Pulse: 79   Weight: 116.3 kg (256 lb 6.3 oz)   Height: 5' 7.5" (1.715 m)       General: NAD, well nourished   Eyes: no tearing, discharge, no erythema   ENT: moist mucous membranes of the oral cavity, nares patent    Neck: Supple, full range of motion  Cardiovascular: Warm and well perfused, pulses equal and symmetrical  Lungs: Normal work of breathing, normal chest wall excursions  Skin: No rash, lesions, or breakdown on exposed skin  Psychiatry: Mood and affect are appropriate   Abdomen: soft, non tender, non distended  Extremeties: No " cyanosis, clubbing or edema.    Neurological   MENTAL STATUS: Alert and oriented to person, place, and time. Attention and concentration within normal limits. Speech without dysarthria, able to name and repeat without difficulty. Recent and remote memory within normal limits   CRANIAL NERVES: Visual fields intact. PERRL. EOMI. Facial sensation intact. Face symmetrical. Hearing grossly intact. Full shoulder shrug bilaterally. Tongue protrudes midline   SENSORY: Sensation is intact to light touch and vibration throughout.  Negative Romberg.   MOTOR: Normal bulk and tone. No pronator drift.  5/5 deltoid, biceps, triceps, interosseous, hand  bilaterally. 5/5 iliopsoas, knee extension/flexion, foot dorsi/plantarflexion bilaterally.    REFLEXES: Symmetric and 2+ throughout. Toes down going bilaterally.   CEREBELLAR/COORDINATION/GAIT: Gait steady with normal arm swing and stride length.  Heel to shin intact. Finger to nose intact. Normal rapid alternating movements.

## 2018-05-15 DIAGNOSIS — E55.9 VITAMIN D DEFICIENCY: Primary | ICD-10-CM

## 2018-05-15 RX ORDER — ERGOCALCIFEROL 1.25 MG/1
50000 CAPSULE ORAL
Qty: 12 CAPSULE | Refills: 3 | Status: SHIPPED | OUTPATIENT
Start: 2018-05-15 | End: 2021-05-17

## 2018-05-16 LAB — VIT B1 SERPL-MCNC: 59 UG/L (ref 38–122)

## 2018-05-17 LAB — PYRIDOXAL SERPL-MCNC: 24 UG/L (ref 5–50)

## 2018-05-18 LAB — VIT B2 SERPL-MCNC: 8 MCG/L (ref 1–19)

## 2018-05-23 ENCOUNTER — HOSPITAL ENCOUNTER (OUTPATIENT)
Dept: RADIOLOGY | Facility: HOSPITAL | Age: 23
Discharge: HOME | End: 2018-05-23
Attending: PSYCHIATRY & NEUROLOGY
Payer: COMMERCIAL

## 2018-05-23 DIAGNOSIS — R29.818 NEUROLOGICAL DEFICIT PRESENT: ICD-10-CM

## 2018-05-23 DIAGNOSIS — R51.9 NONINTRACTABLE HEADACHE, UNSPECIFIED CHRONICITY PATTERN, UNSPECIFIED HEADACHE TYPE: ICD-10-CM

## 2018-05-23 PROCEDURE — 70553 MRI BRAIN STEM W/O & W/DYE: CPT | Mod: 26,,, | Performed by: RADIOLOGY

## 2018-05-23 PROCEDURE — A9585 GADOBUTROL INJECTION: HCPCS | Performed by: PSYCHIATRY & NEUROLOGY

## 2018-05-23 PROCEDURE — 70553 MRI BRAIN STEM W/O & W/DYE: CPT | Mod: TC

## 2018-05-23 PROCEDURE — 25500020 PHARM REV CODE 255: Performed by: PSYCHIATRY & NEUROLOGY

## 2018-05-23 RX ORDER — GADOBUTROL 604.72 MG/ML
10 INJECTION INTRAVENOUS
Status: COMPLETED | OUTPATIENT
Start: 2018-05-23 | End: 2018-05-23

## 2018-05-23 RX ADMIN — GADOBUTROL 10 ML: 604.72 INJECTION INTRAVENOUS at 02:05

## 2019-04-03 PROCEDURE — 99284 EMERGENCY DEPT VISIT MOD MDM: CPT | Mod: 25

## 2019-04-03 PROCEDURE — 96372 THER/PROPH/DIAG INJ SC/IM: CPT

## 2019-04-04 ENCOUNTER — HOSPITAL ENCOUNTER (EMERGENCY)
Facility: HOSPITAL | Age: 24
Discharge: HOME | End: 2019-04-04
Attending: EMERGENCY MEDICINE
Payer: COMMERCIAL

## 2019-04-04 VITALS
TEMPERATURE: 98 F | DIASTOLIC BLOOD PRESSURE: 79 MMHG | HEART RATE: 78 BPM | RESPIRATION RATE: 16 BRPM | BODY MASS INDEX: 41.24 KG/M2 | HEIGHT: 69 IN | SYSTOLIC BLOOD PRESSURE: 137 MMHG | WEIGHT: 278.44 LBS | OXYGEN SATURATION: 99 %

## 2019-04-04 DIAGNOSIS — G89.29 ACUTE EXACERBATION OF CHRONIC LOW BACK PAIN: ICD-10-CM

## 2019-04-04 DIAGNOSIS — S13.4XXA WHIPLASH INJURY TO NECK, INITIAL ENCOUNTER: ICD-10-CM
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
## 2019-04-04 DIAGNOSIS — M54.50 ACUTE EXACERBATION OF CHRONIC LOW BACK PAIN: ICD-10-CM

## 2019-04-04 PROCEDURE — 63600175 PHARM REV CODE 636 W HCPCS: Performed by: NURSE PRACTITIONER

## 2019-04-04 RX ORDER — DICLOFENAC SODIUM 50 MG/1
50 TABLET, DELAYED RELEASE ORAL 3 TIMES DAILY PRN
Qty: 15 TABLET | Refills: 0 | Status: SHIPPED | OUTPATIENT
Start: 2019-04-04 | End: 2021-05-17

## 2019-04-04 RX ORDER — KETOROLAC TROMETHAMINE 30 MG/ML
60 INJECTION, SOLUTION INTRAMUSCULAR; INTRAVENOUS
Status: COMPLETED | OUTPATIENT
Start: 2019-04-04 | End: 2019-04-04

## 2019-04-04 RX ORDER — ORPHENADRINE CITRATE 100 MG/1
100 TABLET, EXTENDED RELEASE ORAL 2 TIMES DAILY PRN
Qty: 20 TABLET | Refills: 0 | Status: SHIPPED | OUTPATIENT
Start: 2019-04-04 | End: 2019-04-14

## 2019-04-04 RX ADMIN — KETOROLAC TROMETHAMINE 60 MG: 30 INJECTION, SOLUTION INTRAMUSCULAR; INTRAVENOUS at 01:04
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
## 2020-07-08 NOTE — ANESTHESIA PREPROCEDURE EVALUATION
08/14/2017  Anderson Mcclain is a 21 y.o., male.    Anesthesia Evaluation         Review of Systems      Physical Exam  General:  Well nourished    Airway/Jaw/Neck:  Airway Findings: Mouth Opening: Normal Tongue: Normal  General Airway Assessment: Adult  Mallampati: II  Improves to II with phonation.  TM Distance: Normal, at least 6 cm      Dental:  Dental Findings: In tact   Chest/Lungs:  Chest/Lungs Findings: Clear to auscultation     Heart/Vascular:  Heart Findings: Rate: Normal  Rhythm: Regular Rhythm  Sounds: Normal        Mental Status:  Mental Status Findings:  Cooperative, Alert and Oriented         Anesthesia Plan  Type of Anesthesia, risks & benefits discussed:  Anesthesia Type:  general  Patient's Preference: General  Intra-op Monitoring Plan: standard ASA monitors  Intra-op Monitoring Plan Comments: Standard ASA monitors.   Post Op Pain Control Plan: per primary service following discharge from PACU  Post Op Pain Control Plan Comments: Per primary service.     Induction:   IV  Beta Blocker:  Patient is not currently on a Beta-Blocker (No further documentation required).       Informed Consent: Patient understands risks and agrees with Anesthesia plan.  Questions answered. Anesthesia consent signed with patient.  ASA Score: 2     Day of Surgery Review of History & Physical:    H&P update referred to the surgeon.     Anesthesia Plan Notes: Chart reviewed, patient interviewed and examined.  The plan for general anesthesia was explained.  Questions were answered and the consent was signed.  Amelie IRWIN         Ready For Surgery From Anesthesia Perspective.        - likely ACD  -Hb dropped 8.2 to 7.3  -f/u Hb at 2pm  -stool guiac  -trend H/H  -transfuse below 7  - f/u TIBC, iron sat

## 2020-10-06 ENCOUNTER — PATIENT MESSAGE (OUTPATIENT)
Dept: ADMINISTRATIVE | Facility: HOSPITAL | Age: 25
End: 2020-10-06

## 2021-02-02 ENCOUNTER — TELEPHONE (OUTPATIENT)
Dept: PSYCHIATRY | Facility: CLINIC | Age: 26
End: 2021-02-02

## 2021-03-25 ENCOUNTER — PATIENT MESSAGE (OUTPATIENT)
Dept: ADMINISTRATIVE | Facility: HOSPITAL | Age: 26
End: 2021-03-25

## 2021-05-17 ENCOUNTER — LAB VISIT (OUTPATIENT)
Dept: LAB | Facility: HOSPITAL | Age: 26
End: 2021-05-17
Attending: INTERNAL MEDICINE
Payer: COMMERCIAL

## 2021-05-17 ENCOUNTER — OFFICE VISIT (OUTPATIENT)
Dept: PULMONOLOGY | Facility: CLINIC | Age: 26
End: 2021-05-17
Payer: COMMERCIAL

## 2021-05-17 ENCOUNTER — OFFICE VISIT (OUTPATIENT)
Dept: GASTROENTEROLOGY | Facility: CLINIC | Age: 26
End: 2021-05-17
Payer: COMMERCIAL

## 2021-05-17 VITALS
BODY MASS INDEX: 46.65 KG/M2 | DIASTOLIC BLOOD PRESSURE: 88 MMHG | HEART RATE: 102 BPM | HEIGHT: 69 IN | WEIGHT: 315 LBS | OXYGEN SATURATION: 97 % | SYSTOLIC BLOOD PRESSURE: 137 MMHG

## 2021-05-17 VITALS
HEART RATE: 83 BPM | DIASTOLIC BLOOD PRESSURE: 70 MMHG | BODY MASS INDEX: 46.65 KG/M2 | WEIGHT: 315 LBS | HEIGHT: 69 IN | SYSTOLIC BLOOD PRESSURE: 120 MMHG

## 2021-05-17 DIAGNOSIS — E66.01 CLASS 3 SEVERE OBESITY DUE TO EXCESS CALORIES WITH SERIOUS COMORBIDITY AND BODY MASS INDEX (BMI) OF 45.0 TO 49.9 IN ADULT: ICD-10-CM

## 2021-05-17 DIAGNOSIS — G47.33 OSA (OBSTRUCTIVE SLEEP APNEA): Primary | ICD-10-CM

## 2021-05-17 DIAGNOSIS — G47.00 INSOMNIA, UNSPECIFIED TYPE: ICD-10-CM

## 2021-05-17 DIAGNOSIS — K21.9 GASTROESOPHAGEAL REFLUX DISEASE, UNSPECIFIED WHETHER ESOPHAGITIS PRESENT: ICD-10-CM

## 2021-05-17 DIAGNOSIS — R10.32 LEFT LOWER QUADRANT PAIN: Primary | ICD-10-CM

## 2021-05-17 DIAGNOSIS — R19.7 DIARRHEA, UNSPECIFIED TYPE: ICD-10-CM

## 2021-05-17 DIAGNOSIS — G47.8 SLEEP PARALYSIS: ICD-10-CM

## 2021-05-17 DIAGNOSIS — R10.32 LEFT LOWER QUADRANT PAIN: ICD-10-CM

## 2021-05-17 DIAGNOSIS — K76.0 FATTY LIVER: Primary | ICD-10-CM

## 2021-05-17 PROBLEM — E66.813 CLASS 3 SEVERE OBESITY DUE TO EXCESS CALORIES WITH SERIOUS COMORBIDITY AND BODY MASS INDEX (BMI) OF 45.0 TO 49.9 IN ADULT: Status: ACTIVE | Noted: 2021-05-17

## 2021-05-17 LAB
BASOPHILS # BLD AUTO: 0.06 K/UL (ref 0–0.2)
BASOPHILS NFR BLD: 0.6 % (ref 0–1.9)
DIFFERENTIAL METHOD: ABNORMAL
EOSINOPHIL # BLD AUTO: 0.2 K/UL (ref 0–0.5)
EOSINOPHIL NFR BLD: 2.4 % (ref 0–8)
ERYTHROCYTE [DISTWIDTH] IN BLOOD BY AUTOMATED COUNT: 13.7 % (ref 11.5–14.5)
HCT VFR BLD AUTO: 42.7 % (ref 40–54)
HGB BLD-MCNC: 13.5 G/DL (ref 14–18)
IMM GRANULOCYTES # BLD AUTO: 0.03 K/UL (ref 0–0.04)
IMM GRANULOCYTES NFR BLD AUTO: 0.3 % (ref 0–0.5)
LYMPHOCYTES # BLD AUTO: 3.3 K/UL (ref 1–4.8)
LYMPHOCYTES NFR BLD: 34.3 % (ref 18–48)
MCH RBC QN AUTO: 27.6 PG (ref 27–31)
MCHC RBC AUTO-ENTMCNC: 31.6 G/DL (ref 32–36)
MCV RBC AUTO: 87 FL (ref 82–98)
MONOCYTES # BLD AUTO: 0.6 K/UL (ref 0.3–1)
MONOCYTES NFR BLD: 6.5 % (ref 4–15)
NEUTROPHILS # BLD AUTO: 5.4 K/UL (ref 1.8–7.7)
NEUTROPHILS NFR BLD: 55.9 % (ref 38–73)
NRBC BLD-RTO: 0 /100 WBC
PLATELET # BLD AUTO: 317 K/UL (ref 150–450)
PMV BLD AUTO: 10.9 FL (ref 9.2–12.9)
RBC # BLD AUTO: 4.89 M/UL (ref 4.6–6.2)
WBC # BLD AUTO: 9.61 K/UL (ref 3.9–12.7)

## 2021-05-17 PROCEDURE — 1126F AMNT PAIN NOTED NONE PRSNT: CPT | Mod: S$GLB,,, | Performed by: INTERNAL MEDICINE

## 2021-05-17 PROCEDURE — 99999 PR PBB SHADOW E&M-EST. PATIENT-LVL III: ICD-10-PCS | Mod: PBBFAC,,, | Performed by: INTERNAL MEDICINE

## 2021-05-17 PROCEDURE — 85025 COMPLETE CBC W/AUTO DIFF WBC: CPT | Performed by: INTERNAL MEDICINE

## 2021-05-17 PROCEDURE — 36415 COLL VENOUS BLD VENIPUNCTURE: CPT | Performed by: INTERNAL MEDICINE

## 2021-05-17 PROCEDURE — 3008F PR BODY MASS INDEX (BMI) DOCUMENTED: ICD-10-PCS | Mod: CPTII,S$GLB,, | Performed by: INTERNAL MEDICINE

## 2021-05-17 PROCEDURE — 99204 PR OFFICE/OUTPT VISIT, NEW, LEVL IV, 45-59 MIN: ICD-10-PCS | Mod: S$GLB,,, | Performed by: INTERNAL MEDICINE

## 2021-05-17 PROCEDURE — 1126F PR PAIN SEVERITY QUANTIFIED, NO PAIN PRESENT: ICD-10-PCS | Mod: S$GLB,,, | Performed by: INTERNAL MEDICINE

## 2021-05-17 PROCEDURE — 3008F BODY MASS INDEX DOCD: CPT | Mod: CPTII,S$GLB,, | Performed by: INTERNAL MEDICINE

## 2021-05-17 PROCEDURE — 99204 OFFICE O/P NEW MOD 45 MIN: CPT | Mod: S$GLB,,, | Performed by: INTERNAL MEDICINE

## 2021-05-17 PROCEDURE — 1125F AMNT PAIN NOTED PAIN PRSNT: CPT | Mod: S$GLB,,, | Performed by: INTERNAL MEDICINE

## 2021-05-17 PROCEDURE — 80053 COMPREHEN METABOLIC PANEL: CPT | Performed by: INTERNAL MEDICINE

## 2021-05-17 PROCEDURE — 99999 PR PBB SHADOW E&M-EST. PATIENT-LVL III: CPT | Mod: PBBFAC,,, | Performed by: INTERNAL MEDICINE

## 2021-05-17 PROCEDURE — 1125F PR PAIN SEVERITY QUANTIFIED, PAIN PRESENT: ICD-10-PCS | Mod: S$GLB,,, | Performed by: INTERNAL MEDICINE

## 2021-05-17 RX ORDER — SODIUM, POTASSIUM,MAG SULFATES 17.5-3.13G
1 SOLUTION, RECONSTITUTED, ORAL ORAL ONCE
Qty: 1 BOTTLE | Refills: 0 | Status: SHIPPED | OUTPATIENT
Start: 2021-05-17 | End: 2021-05-17

## 2021-05-17 RX ORDER — CEFDINIR 300 MG/1
300 CAPSULE ORAL 2 TIMES DAILY
COMMUNITY
Start: 2021-05-10 | End: 2021-05-27 | Stop reason: CLARIF

## 2021-05-18 LAB
ALBUMIN SERPL BCP-MCNC: 4 G/DL (ref 3.5–5.2)
ALP SERPL-CCNC: 51 U/L (ref 55–135)
ALT SERPL W/O P-5'-P-CCNC: 36 U/L (ref 10–44)
ANION GAP SERPL CALC-SCNC: 11 MMOL/L (ref 8–16)
AST SERPL-CCNC: 23 U/L (ref 10–40)
BILIRUB SERPL-MCNC: 0.3 MG/DL (ref 0.1–1)
BUN SERPL-MCNC: 12 MG/DL (ref 6–20)
CALCIUM SERPL-MCNC: 10 MG/DL (ref 8.7–10.5)
CHLORIDE SERPL-SCNC: 103 MMOL/L (ref 95–110)
CO2 SERPL-SCNC: 24 MMOL/L (ref 23–29)
CREAT SERPL-MCNC: 0.8 MG/DL (ref 0.5–1.4)
EST. GFR  (AFRICAN AMERICAN): >60 ML/MIN/1.73 M^2
EST. GFR  (NON AFRICAN AMERICAN): >60 ML/MIN/1.73 M^2
GLUCOSE SERPL-MCNC: 97 MG/DL (ref 70–110)
POTASSIUM SERPL-SCNC: 4.7 MMOL/L (ref 3.5–5.1)
PROT SERPL-MCNC: 7.5 G/DL (ref 6–8.4)
SODIUM SERPL-SCNC: 138 MMOL/L (ref 136–145)

## 2021-05-19 ENCOUNTER — PATIENT MESSAGE (OUTPATIENT)
Dept: HEPATOLOGY | Facility: CLINIC | Age: 26
End: 2021-05-19

## 2021-05-20 ENCOUNTER — TELEPHONE (OUTPATIENT)
Dept: SLEEP MEDICINE | Facility: OTHER | Age: 26
End: 2021-05-20

## 2021-05-24 ENCOUNTER — TELEPHONE (OUTPATIENT)
Dept: RADIOLOGY | Facility: HOSPITAL | Age: 26
End: 2021-05-24

## 2021-05-25 ENCOUNTER — PROCEDURE VISIT (OUTPATIENT)
Dept: HEPATOLOGY | Facility: CLINIC | Age: 26
End: 2021-05-25
Attending: INTERNAL MEDICINE
Payer: COMMERCIAL

## 2021-05-25 ENCOUNTER — HOSPITAL ENCOUNTER (OUTPATIENT)
Dept: RADIOLOGY | Facility: HOSPITAL | Age: 26
Discharge: HOME | End: 2021-05-25
Attending: INTERNAL MEDICINE
Payer: COMMERCIAL

## 2021-05-25 VITALS — BODY MASS INDEX: 46.14 KG/M2 | WEIGHT: 311.5 LBS | HEIGHT: 69 IN

## 2021-05-25 DIAGNOSIS — K76.0 FATTY LIVER: ICD-10-CM

## 2021-05-25 DIAGNOSIS — R10.32 LEFT LOWER QUADRANT PAIN: ICD-10-CM

## 2021-05-25 PROCEDURE — A9698 NON-RAD CONTRAST MATERIALNOC: HCPCS | Performed by: INTERNAL MEDICINE

## 2021-05-25 PROCEDURE — 25500020 PHARM REV CODE 255: Performed by: INTERNAL MEDICINE

## 2021-05-25 PROCEDURE — 91200 LIVER ELASTOGRAPHY: CPT | Mod: S$GLB,,, | Performed by: NURSE PRACTITIONER

## 2021-05-25 PROCEDURE — 91200 PR LIVER ELASTOGRAPHY W/OUT IMAG W/INTERP & REPORT: ICD-10-PCS | Mod: S$GLB,,, | Performed by: NURSE PRACTITIONER

## 2021-05-25 PROCEDURE — 74178 CT ABD&PLV WO CNTR FLWD CNTR: CPT | Mod: TC

## 2021-05-25 PROCEDURE — 74178 CT ABD&PLV WO CNTR FLWD CNTR: CPT | Mod: 26,,, | Performed by: RADIOLOGY

## 2021-05-25 PROCEDURE — 74178 CT ABDOMEN PELVIS W WO CONTRAST: ICD-10-PCS | Mod: 26,,, | Performed by: RADIOLOGY

## 2021-05-25 RX ADMIN — IOHEXOL 100 ML: 350 INJECTION, SOLUTION INTRAVENOUS at 09:05

## 2021-05-25 RX ADMIN — IOHEXOL 1000 ML: 12 SOLUTION ORAL at 08:05

## 2021-05-26 ENCOUNTER — TELEPHONE (OUTPATIENT)
Dept: HEPATOLOGY | Facility: CLINIC | Age: 26
End: 2021-05-26

## 2021-05-27 ENCOUNTER — HOSPITAL ENCOUNTER (EMERGENCY)
Facility: HOSPITAL | Age: 26
Discharge: HOME | End: 2021-05-28
Attending: EMERGENCY MEDICINE
Payer: COMMERCIAL

## 2021-05-27 DIAGNOSIS — R42 DIZZINESS: ICD-10-CM

## 2021-05-27 DIAGNOSIS — R20.2 PARESTHESIAS: Primary | ICD-10-CM

## 2021-05-27 DIAGNOSIS — R45.89 ANXIETY ABOUT HEALTH: ICD-10-CM

## 2021-05-27 DIAGNOSIS — E66.01 CLASS 3 SEVERE OBESITY WITH BODY MASS INDEX (BMI) OF 45.0 TO 49.9 IN ADULT, UNSPECIFIED OBESITY TYPE, UNSPECIFIED WHETHER SERIOUS COMORBIDITY PRESENT: ICD-10-CM

## 2021-05-27 LAB
ALBUMIN SERPL BCP-MCNC: 4.4 G/DL (ref 3.5–5.2)
ALP SERPL-CCNC: 54 U/L (ref 55–135)
ALT SERPL W/O P-5'-P-CCNC: 25 U/L (ref 10–44)
AMPHET+METHAMPHET UR QL: NEGATIVE
ANION GAP SERPL CALC-SCNC: 12 MMOL/L (ref 8–16)
AST SERPL-CCNC: 19 U/L (ref 10–40)
BARBITURATES UR QL SCN>200 NG/ML: NEGATIVE
BASOPHILS # BLD AUTO: 0.06 K/UL (ref 0–0.2)
BASOPHILS NFR BLD: 0.4 % (ref 0–1.9)
BENZODIAZ UR QL SCN>200 NG/ML: NEGATIVE
BILIRUB SERPL-MCNC: 0.4 MG/DL (ref 0.1–1)
BILIRUB UR QL STRIP: NEGATIVE
BNP SERPL-MCNC: <10 PG/ML (ref 0–99)
BUN SERPL-MCNC: 11 MG/DL (ref 6–20)
BZE UR QL SCN: NEGATIVE
CALCIUM SERPL-MCNC: 9.5 MG/DL (ref 8.7–10.5)
CANNABINOIDS UR QL SCN: NORMAL
CHLORIDE SERPL-SCNC: 104 MMOL/L (ref 95–110)
CLARITY UR: CLEAR
CO2 SERPL-SCNC: 24 MMOL/L (ref 23–29)
COLOR UR: YELLOW
CREAT SERPL-MCNC: 0.8 MG/DL (ref 0.5–1.4)
CREAT UR-MCNC: 140.9 MG/DL (ref 23–375)
DIFFERENTIAL METHOD: ABNORMAL
EOSINOPHIL # BLD AUTO: 0.1 K/UL (ref 0–0.5)
EOSINOPHIL NFR BLD: 0.8 % (ref 0–8)
ERYTHROCYTE [DISTWIDTH] IN BLOOD BY AUTOMATED COUNT: 13.3 % (ref 11.5–14.5)
EST. GFR  (AFRICAN AMERICAN): >60 ML/MIN/1.73 M^2
EST. GFR  (NON AFRICAN AMERICAN): >60 ML/MIN/1.73 M^2
GLUCOSE SERPL-MCNC: 102 MG/DL (ref 70–110)
GLUCOSE UR QL STRIP: NEGATIVE
HCT VFR BLD AUTO: 42.6 % (ref 40–54)
HEP C VIRUS HOLD SPECIMEN: NORMAL
HGB BLD-MCNC: 13.9 G/DL (ref 14–18)
HGB UR QL STRIP: NEGATIVE
IMM GRANULOCYTES # BLD AUTO: 0.04 K/UL (ref 0–0.04)
IMM GRANULOCYTES NFR BLD AUTO: 0.3 % (ref 0–0.5)
KETONES UR QL STRIP: NEGATIVE
LEUKOCYTE ESTERASE UR QL STRIP: NEGATIVE
LYMPHOCYTES # BLD AUTO: 4.1 K/UL (ref 1–4.8)
LYMPHOCYTES NFR BLD: 29.7 % (ref 18–48)
MAGNESIUM SERPL-MCNC: 2.1 MG/DL (ref 1.6–2.6)
MCH RBC QN AUTO: 27.9 PG (ref 27–31)
MCHC RBC AUTO-ENTMCNC: 32.6 G/DL (ref 32–36)
MCV RBC AUTO: 86 FL (ref 82–98)
METHADONE UR QL SCN>300 NG/ML: NEGATIVE
MONOCYTES # BLD AUTO: 0.7 K/UL (ref 0.3–1)
MONOCYTES NFR BLD: 5 % (ref 4–15)
NEUTROPHILS # BLD AUTO: 8.9 K/UL (ref 1.8–7.7)
NEUTROPHILS NFR BLD: 63.8 % (ref 38–73)
NITRITE UR QL STRIP: NEGATIVE
NRBC BLD-RTO: 0 /100 WBC
OPIATES UR QL SCN: NEGATIVE
PCP UR QL SCN>25 NG/ML: NEGATIVE
PH UR STRIP: 6 [PH] (ref 5–8)
PLATELET # BLD AUTO: 372 K/UL (ref 150–450)
PMV BLD AUTO: 10.3 FL (ref 9.2–12.9)
POCT GLUCOSE: 93 MG/DL (ref 70–110)
POTASSIUM SERPL-SCNC: 3.8 MMOL/L (ref 3.5–5.1)
PROT SERPL-MCNC: 8.2 G/DL (ref 6–8.4)
PROT UR QL STRIP: NEGATIVE
RBC # BLD AUTO: 4.98 M/UL (ref 4.6–6.2)
SODIUM SERPL-SCNC: 140 MMOL/L (ref 136–145)
SP GR UR STRIP: >=1.03 (ref 1–1.03)
TOXICOLOGY INFORMATION: NORMAL
TROPONIN I SERPL DL<=0.01 NG/ML-MCNC: <0.006 NG/ML (ref 0–0.03)
URN SPEC COLLECT METH UR: ABNORMAL
UROBILINOGEN UR STRIP-ACNC: NEGATIVE EU/DL
WBC # BLD AUTO: 13.9 K/UL (ref 3.9–12.7)

## 2021-05-27 PROCEDURE — 82962 GLUCOSE BLOOD TEST: CPT

## 2021-05-27 PROCEDURE — 80053 COMPREHEN METABOLIC PANEL: CPT | Performed by: PHYSICIAN ASSISTANT

## 2021-05-27 PROCEDURE — 84484 ASSAY OF TROPONIN QUANT: CPT | Performed by: PHYSICIAN ASSISTANT

## 2021-05-27 PROCEDURE — 80307 DRUG TEST PRSMV CHEM ANLYZR: CPT | Performed by: PHYSICIAN ASSISTANT

## 2021-05-27 PROCEDURE — 99285 EMERGENCY DEPT VISIT HI MDM: CPT | Mod: 25

## 2021-05-27 PROCEDURE — 85025 COMPLETE CBC W/AUTO DIFF WBC: CPT | Performed by: PHYSICIAN ASSISTANT

## 2021-05-27 PROCEDURE — 93010 EKG 12-LEAD: ICD-10-PCS | Mod: ,,, | Performed by: INTERNAL MEDICINE

## 2021-05-27 PROCEDURE — 36415 COLL VENOUS BLD VENIPUNCTURE: CPT | Performed by: EMERGENCY MEDICINE

## 2021-05-27 PROCEDURE — 93010 ELECTROCARDIOGRAM REPORT: CPT | Mod: ,,, | Performed by: INTERNAL MEDICINE

## 2021-05-27 PROCEDURE — 83735 ASSAY OF MAGNESIUM: CPT | Performed by: PHYSICIAN ASSISTANT

## 2021-05-27 PROCEDURE — 83880 ASSAY OF NATRIURETIC PEPTIDE: CPT | Performed by: PHYSICIAN ASSISTANT

## 2021-05-27 PROCEDURE — 81003 URINALYSIS AUTO W/O SCOPE: CPT | Mod: 59 | Performed by: PHYSICIAN ASSISTANT

## 2021-05-27 PROCEDURE — 93005 ELECTROCARDIOGRAM TRACING: CPT

## 2021-05-27 RX ORDER — OXYCODONE AND ACETAMINOPHEN 5; 325 MG/1; MG/1
TABLET ORAL
COMMUNITY
Start: 2021-05-18 | End: 2021-05-27 | Stop reason: CLARIF

## 2021-05-27 RX ORDER — SODIUM, POTASSIUM,MAG SULFATES 17.5-3.13G
SOLUTION, RECONSTITUTED, ORAL ORAL
COMMUNITY
Start: 2021-05-17 | End: 2021-05-27 | Stop reason: CLARIF

## 2021-05-28 ENCOUNTER — OFFICE VISIT (OUTPATIENT)
Dept: CARDIOLOGY | Facility: CLINIC | Age: 26
End: 2021-05-28
Payer: COMMERCIAL

## 2021-05-28 VITALS
DIASTOLIC BLOOD PRESSURE: 90 MMHG | BODY MASS INDEX: 45.81 KG/M2 | WEIGHT: 310.19 LBS | SYSTOLIC BLOOD PRESSURE: 140 MMHG | OXYGEN SATURATION: 97 % | HEART RATE: 94 BPM

## 2021-05-28 VITALS
WEIGHT: 311 LBS | HEIGHT: 69 IN | DIASTOLIC BLOOD PRESSURE: 60 MMHG | OXYGEN SATURATION: 98 % | TEMPERATURE: 98 F | RESPIRATION RATE: 27 BRPM | SYSTOLIC BLOOD PRESSURE: 124 MMHG | BODY MASS INDEX: 46.06 KG/M2 | HEART RATE: 83 BPM

## 2021-05-28 DIAGNOSIS — E66.01 CLASS 3 SEVERE OBESITY DUE TO EXCESS CALORIES WITH SERIOUS COMORBIDITY AND BODY MASS INDEX (BMI) OF 45.0 TO 49.9 IN ADULT: ICD-10-CM

## 2021-05-28 DIAGNOSIS — F41.9 ANXIETY: ICD-10-CM

## 2021-05-28 DIAGNOSIS — R42 DIZZINESS: ICD-10-CM

## 2021-05-28 DIAGNOSIS — Z76.89 ESTABLISHING CARE WITH NEW DOCTOR, ENCOUNTER FOR: Primary | ICD-10-CM

## 2021-05-28 DIAGNOSIS — R07.89 OTHER CHEST PAIN: Primary | ICD-10-CM

## 2021-05-28 DIAGNOSIS — R06.02 SOB (SHORTNESS OF BREATH): ICD-10-CM

## 2021-05-28 PROBLEM — R07.9 CHEST PAIN: Status: ACTIVE | Noted: 2021-05-28

## 2021-05-28 LAB — POCT GLUCOSE: 106 MG/DL (ref 70–110)

## 2021-05-28 PROCEDURE — 99205 PR OFFICE/OUTPT VISIT, NEW, LEVL V, 60-74 MIN: ICD-10-PCS | Mod: S$GLB,,, | Performed by: INTERNAL MEDICINE

## 2021-05-28 PROCEDURE — 25000003 PHARM REV CODE 250: Performed by: EMERGENCY MEDICINE

## 2021-05-28 PROCEDURE — 99999 PR PBB SHADOW E&M-EST. PATIENT-LVL III: CPT | Mod: PBBFAC,,, | Performed by: INTERNAL MEDICINE

## 2021-05-28 PROCEDURE — 99999 PR PBB SHADOW E&M-EST. PATIENT-LVL III: ICD-10-PCS | Mod: PBBFAC,,, | Performed by: INTERNAL MEDICINE

## 2021-05-28 PROCEDURE — 99205 OFFICE O/P NEW HI 60 MIN: CPT | Mod: S$GLB,,, | Performed by: INTERNAL MEDICINE

## 2021-05-28 RX ORDER — MECLIZINE HYDROCHLORIDE 25 MG/1
25 TABLET ORAL
Status: COMPLETED | OUTPATIENT
Start: 2021-05-28 | End: 2021-05-28

## 2021-05-28 RX ORDER — CLONAZEPAM 0.5 MG/1
0.5 TABLET ORAL NIGHTLY
Qty: 10 TABLET | Refills: 0 | Status: SHIPPED | OUTPATIENT
Start: 2021-05-28 | End: 2021-06-28

## 2021-05-28 RX ORDER — MECLIZINE HYDROCHLORIDE 25 MG/1
25 TABLET ORAL 3 TIMES DAILY PRN
Qty: 30 TABLET | Refills: 0 | Status: SHIPPED | OUTPATIENT
Start: 2021-05-28 | End: 2021-06-28

## 2021-05-28 RX ADMIN — MECLIZINE HYDROCHLORIDE 25 MG: 25 TABLET ORAL at 12:05

## 2021-06-01 ENCOUNTER — LAB VISIT (OUTPATIENT)
Dept: LAB | Facility: HOSPITAL | Age: 26
End: 2021-06-01
Attending: INTERNAL MEDICINE
Payer: COMMERCIAL

## 2021-06-01 DIAGNOSIS — R19.7 DIARRHEA, UNSPECIFIED TYPE: ICD-10-CM

## 2021-06-01 DIAGNOSIS — R10.32 LEFT LOWER QUADRANT PAIN: ICD-10-CM

## 2021-06-01 PROCEDURE — 87493 C DIFF AMPLIFIED PROBE: CPT | Performed by: INTERNAL MEDICINE

## 2021-06-01 PROCEDURE — 83993 ASSAY FOR CALPROTECTIN FECAL: CPT | Performed by: INTERNAL MEDICINE

## 2021-06-02 LAB — C DIFF TOX GENS STL QL NAA+PROBE: POSITIVE

## 2021-06-02 RX ORDER — VANCOMYCIN HYDROCHLORIDE 125 MG/1
125 CAPSULE ORAL EVERY 6 HOURS
Qty: 40 CAPSULE | Refills: 0 | Status: SHIPPED | OUTPATIENT
Start: 2021-06-02 | End: 2021-06-12

## 2021-06-03 ENCOUNTER — PATIENT MESSAGE (OUTPATIENT)
Dept: ENDOSCOPY | Facility: HOSPITAL | Age: 26
End: 2021-06-03

## 2021-06-07 LAB — CALPROTECTIN STL-MCNT: <27.1 MCG/G

## 2021-06-18 ENCOUNTER — TELEPHONE (OUTPATIENT)
Dept: SLEEP MEDICINE | Facility: OTHER | Age: 26
End: 2021-06-18

## 2021-06-18 ENCOUNTER — TELEPHONE (OUTPATIENT)
Dept: ENDOSCOPY | Facility: HOSPITAL | Age: 26
End: 2021-06-18

## 2021-06-21 ENCOUNTER — HOSPITAL ENCOUNTER (OUTPATIENT)
Dept: CARDIOLOGY | Facility: HOSPITAL | Age: 26
Discharge: HOME | End: 2021-06-21
Attending: INTERNAL MEDICINE
Payer: COMMERCIAL

## 2021-06-21 ENCOUNTER — TELEPHONE (OUTPATIENT)
Dept: SLEEP MEDICINE | Facility: OTHER | Age: 26
End: 2021-06-21

## 2021-06-21 ENCOUNTER — PATIENT MESSAGE (OUTPATIENT)
Dept: GASTROENTEROLOGY | Facility: CLINIC | Age: 26
End: 2021-06-21

## 2021-06-21 VITALS
HEIGHT: 69 IN | SYSTOLIC BLOOD PRESSURE: 135 MMHG | BODY MASS INDEX: 45.91 KG/M2 | DIASTOLIC BLOOD PRESSURE: 67 MMHG | WEIGHT: 310 LBS

## 2021-06-21 VITALS — WEIGHT: 310 LBS | BODY MASS INDEX: 45.91 KG/M2 | HEIGHT: 69 IN

## 2021-06-21 DIAGNOSIS — R42 DIZZINESS: ICD-10-CM

## 2021-06-21 DIAGNOSIS — R07.89 OTHER CHEST PAIN: ICD-10-CM

## 2021-06-21 DIAGNOSIS — R06.02 SOB (SHORTNESS OF BREATH): ICD-10-CM

## 2021-06-21 LAB
AORTIC ROOT ANNULUS: 3.33 CM
AV INDEX (PROSTH): 0.78
AV MEAN GRADIENT: 3 MMHG
AV PEAK GRADIENT: 5 MMHG
AV VALVE AREA: 2.48 CM2
AV VELOCITY RATIO: 0.8
BSA FOR ECHO PROCEDURE: 2.62 M2
CV ECHO LV RWT: 0.49 CM
CV STRESS BASE HR: 98 BPM
DIASTOLIC BLOOD PRESSURE: 94 MMHG
DOP CALC AO PEAK VEL: 1.1 M/S
DOP CALC AO VTI: 23.47 CM
DOP CALC LVOT AREA: 3.2 CM2
DOP CALC LVOT DIAMETER: 2.01 CM
DOP CALC LVOT PEAK VEL: 0.88 M/S
DOP CALC LVOT STROKE VOLUME: 58.16 CM3
DOP CALC RVOT PEAK VEL: 0.77 M/S
DOP CALC RVOT VTI: 16.37 CM
DOP CALCLVOT PEAK VEL VTI: 18.34 CM
E WAVE DECELERATION TIME: 373.96 MSEC
E/A RATIO: 1.67
E/E' RATIO: 6.57 M/S
ECHO LV POSTERIOR WALL: 1.16 CM (ref 0.6–1.1)
EJECTION FRACTION: 60 %
FRACTIONAL SHORTENING: 30 % (ref 28–44)
INTERVENTRICULAR SEPTUM: 1.08 CM (ref 0.6–1.1)
IVRT: 85.63 MSEC
LA MAJOR: 5.03 CM
LA MINOR: 4.5 CM
LA WIDTH: 3.08 CM
LEFT ATRIUM SIZE: 3.5 CM
LEFT ATRIUM VOLUME INDEX: 17.5 ML/M2
LEFT ATRIUM VOLUME: 43.53 CM3
LEFT CBA DIAS: 28 CM/S
LEFT CBA SYS: 84 CM/S
LEFT CCA DIST DIAS: 34 CM/S
LEFT CCA DIST SYS: 116 CM/S
LEFT CCA MID DIAS: 32 CM/S
LEFT CCA MID SYS: 112 CM/S
LEFT CCA PROX DIAS: 27 CM/S
LEFT CCA PROX SYS: 114 CM/S
LEFT ECA DIAS: 15 CM/S
LEFT ECA SYS: 90 CM/S
LEFT ICA DIST DIAS: 31 CM/S
LEFT ICA DIST SYS: 58 CM/S
LEFT ICA MID DIAS: 32 CM/S
LEFT ICA MID SYS: 76 CM/S
LEFT ICA PROX DIAS: 34 CM/S
LEFT ICA PROX SYS: 77 CM/S
LEFT INTERNAL DIMENSION IN SYSTOLE: 3.32 CM (ref 2.1–4)
LEFT VENTRICLE DIASTOLIC VOLUME INDEX: 42.38 ML/M2
LEFT VENTRICLE DIASTOLIC VOLUME: 105.53 ML
LEFT VENTRICLE MASS INDEX: 79 G/M2
LEFT VENTRICLE SYSTOLIC VOLUME INDEX: 18 ML/M2
LEFT VENTRICLE SYSTOLIC VOLUME: 44.93 ML
LEFT VENTRICULAR INTERNAL DIMENSION IN DIASTOLE: 4.76 CM (ref 3.5–6)
LEFT VENTRICULAR MASS: 196.24 G
LEFT VERTEBRAL DIAS: 14 CM/S
LEFT VERTEBRAL SYS: 37 CM/S
LV LATERAL E/E' RATIO: 6.57 M/S
LV SEPTAL E/E' RATIO: 6.57 M/S
MV PEAK A VEL: 0.55 M/S
MV PEAK E VEL: 0.92 M/S
OHS CV CAROTID RIGHT ICA EDV HIGHEST: 37
OHS CV CAROTID ULTRASOUND LEFT ICA/CCA RATIO: 0.66
OHS CV CAROTID ULTRASOUND RIGHT ICA/CCA RATIO: 0.78
OHS CV CPX 1 MINUTE RECOVERY HEART RATE: 160 BPM
OHS CV CPX 85 PERCENT MAX PREDICTED HEART RATE MALE: 167
OHS CV CPX ESTIMATED METS: 7
OHS CV CPX MAX PREDICTED HEART RATE: 197
OHS CV CPX PATIENT IS FEMALE: 0
OHS CV CPX PATIENT IS MALE: 1
OHS CV CPX PEAK DIASTOLIC BLOOD PRESSURE: 63 MMHG
OHS CV CPX PEAK HEAR RATE: 176 BPM
OHS CV CPX PEAK RATE PRESSURE PRODUCT: NORMAL
OHS CV CPX PEAK SYSTOLIC BLOOD PRESSURE: 211 MMHG
OHS CV CPX PERCENT MAX PREDICTED HEART RATE ACHIEVED: 89
OHS CV CPX RATE PRESSURE PRODUCT PRESENTING: NORMAL
OHS CV PV CAROTID LEFT HIGHEST CCA: 116
OHS CV PV CAROTID LEFT HIGHEST ICA: 77
OHS CV PV CAROTID RIGHT HIGHEST CCA: 127
OHS CV PV CAROTID RIGHT HIGHEST ICA: 79
OHS CV US CAROTID LEFT HIGHEST EDV: 34
PISA TR MAX VEL: 2.16 M/S
PULM VEIN S/D RATIO: 1.41
PV MEAN GRADIENT: 2 MMHG
PV PEAK D VEL: 0.41 M/S
PV PEAK S VEL: 0.58 M/S
PV PEAK VELOCITY: 1.22 CM/S
RA MAJOR: 5 CM
RA PRESSURE: 3 MMHG
RA WIDTH: 3.32 CM
RIGHT ARM DIASTOLIC BLOOD PRESSURE: 67 MMHG
RIGHT ARM SYSTOLIC BLOOD PRESSURE: 135 MMHG
RIGHT CBA DIAS: 26 CM/S
RIGHT CBA SYS: 90 CM/S
RIGHT CCA DIST DIAS: 26 CM/S
RIGHT CCA DIST SYS: 101 CM/S
RIGHT CCA MID DIAS: 31 CM/S
RIGHT CCA MID SYS: 106 CM/S
RIGHT CCA PROX DIAS: 22 CM/S
RIGHT CCA PROX SYS: 127 CM/S
RIGHT ECA DIAS: 20 CM/S
RIGHT ECA SYS: 114 CM/S
RIGHT ICA DIST DIAS: 37 CM/S
RIGHT ICA DIST SYS: 68 CM/S
RIGHT ICA MID DIAS: 28 CM/S
RIGHT ICA MID SYS: 67 CM/S
RIGHT ICA PROX DIAS: 29 CM/S
RIGHT ICA PROX SYS: 79 CM/S
RIGHT VENTRICULAR END-DIASTOLIC DIMENSION: 2.65 CM
RIGHT VERTEBRAL DIAS: 12 CM/S
RIGHT VERTEBRAL SYS: 35 CM/S
SINUS: 2.38 CM
STJ: 2.54 CM
STRESS ECHO POST EXERCISE DUR MIN: 6 MINUTES
STRESS ECHO POST EXERCISE DUR SEC: 10 SECONDS
SYSTOLIC BLOOD PRESSURE: 143 MMHG
TDI LATERAL: 0.14 M/S
TDI SEPTAL: 0.14 M/S
TDI: 0.14 M/S
TR MAX PG: 19 MMHG
TRICUSPID ANNULAR PLANE SYSTOLIC EXCURSION: 2.05 CM
TV REST PULMONARY ARTERY PRESSURE: 22 MMHG

## 2021-06-21 PROCEDURE — 93016 EXERCISE STRESS - EKG (CUPID ONLY): ICD-10-PCS | Mod: ,,, | Performed by: INTERNAL MEDICINE

## 2021-06-21 PROCEDURE — 93306 TTE W/DOPPLER COMPLETE: CPT

## 2021-06-21 PROCEDURE — 93018 CV STRESS TEST I&R ONLY: CPT | Mod: ,,, | Performed by: INTERNAL MEDICINE

## 2021-06-21 PROCEDURE — 93880 CV US DOPPLER CAROTID (CUPID ONLY): ICD-10-PCS | Mod: 26,,, | Performed by: INTERNAL MEDICINE

## 2021-06-21 PROCEDURE — 93880 EXTRACRANIAL BILAT STUDY: CPT

## 2021-06-21 PROCEDURE — 93016 CV STRESS TEST SUPVJ ONLY: CPT | Mod: ,,, | Performed by: INTERNAL MEDICINE

## 2021-06-21 PROCEDURE — 93306 TTE W/DOPPLER COMPLETE: CPT | Mod: 26,,, | Performed by: INTERNAL MEDICINE

## 2021-06-21 PROCEDURE — 93018 EXERCISE STRESS - EKG (CUPID ONLY): ICD-10-PCS | Mod: ,,, | Performed by: INTERNAL MEDICINE

## 2021-06-21 PROCEDURE — 93880 EXTRACRANIAL BILAT STUDY: CPT | Mod: 26,,, | Performed by: INTERNAL MEDICINE

## 2021-06-21 PROCEDURE — 93306 ECHO (CUPID ONLY): ICD-10-PCS | Mod: 26,,, | Performed by: INTERNAL MEDICINE

## 2021-06-22 ENCOUNTER — TELEPHONE (OUTPATIENT)
Dept: CARDIOLOGY | Facility: CLINIC | Age: 26
End: 2021-06-22

## 2021-06-22 DIAGNOSIS — A04.8 H. PYLORI INFECTION: Primary | ICD-10-CM

## 2021-06-23 ENCOUNTER — OFFICE VISIT (OUTPATIENT)
Dept: HEPATOLOGY | Facility: CLINIC | Age: 26
End: 2021-06-23
Payer: COMMERCIAL

## 2021-06-23 ENCOUNTER — TELEPHONE (OUTPATIENT)
Dept: SLEEP MEDICINE | Facility: OTHER | Age: 26
End: 2021-06-23

## 2021-06-23 VITALS
HEIGHT: 69 IN | SYSTOLIC BLOOD PRESSURE: 122 MMHG | HEART RATE: 72 BPM | DIASTOLIC BLOOD PRESSURE: 88 MMHG | WEIGHT: 315 LBS | BODY MASS INDEX: 46.65 KG/M2

## 2021-06-23 DIAGNOSIS — K76.0 FATTY LIVER: Primary | ICD-10-CM

## 2021-06-23 DIAGNOSIS — E66.01 MORBID OBESITY WITH BMI OF 45.0-49.9, ADULT: ICD-10-CM

## 2021-06-23 DIAGNOSIS — A04.72 C. DIFFICILE COLITIS: ICD-10-CM

## 2021-06-23 PROCEDURE — 3008F BODY MASS INDEX DOCD: CPT | Mod: CPTII,S$GLB,, | Performed by: INTERNAL MEDICINE

## 2021-06-23 PROCEDURE — 99215 OFFICE O/P EST HI 40 MIN: CPT | Mod: S$GLB,,, | Performed by: INTERNAL MEDICINE

## 2021-06-23 PROCEDURE — 99999 PR PBB SHADOW E&M-EST. PATIENT-LVL III: ICD-10-PCS | Mod: PBBFAC,,, | Performed by: INTERNAL MEDICINE

## 2021-06-23 PROCEDURE — 99215 PR OFFICE/OUTPT VISIT, EST, LEVL V, 40-54 MIN: ICD-10-PCS | Mod: S$GLB,,, | Performed by: INTERNAL MEDICINE

## 2021-06-23 PROCEDURE — 1125F AMNT PAIN NOTED PAIN PRSNT: CPT | Mod: S$GLB,,, | Performed by: INTERNAL MEDICINE

## 2021-06-23 PROCEDURE — 99999 PR PBB SHADOW E&M-EST. PATIENT-LVL III: CPT | Mod: PBBFAC,,, | Performed by: INTERNAL MEDICINE

## 2021-06-23 PROCEDURE — 1125F PR PAIN SEVERITY QUANTIFIED, PAIN PRESENT: ICD-10-PCS | Mod: S$GLB,,, | Performed by: INTERNAL MEDICINE

## 2021-06-23 PROCEDURE — 3008F PR BODY MASS INDEX (BMI) DOCUMENTED: ICD-10-PCS | Mod: CPTII,S$GLB,, | Performed by: INTERNAL MEDICINE

## 2021-06-23 RX ORDER — BUSPIRONE HYDROCHLORIDE 15 MG/1
15 TABLET ORAL 2 TIMES DAILY
COMMUNITY
Start: 2021-06-09 | End: 2021-09-07

## 2021-06-26 ENCOUNTER — HOSPITAL ENCOUNTER (OUTPATIENT)
Dept: SLEEP MEDICINE | Facility: OTHER | Age: 26
Discharge: HOME | End: 2021-06-26
Attending: INTERNAL MEDICINE
Payer: COMMERCIAL

## 2021-06-26 DIAGNOSIS — G47.33 OSA (OBSTRUCTIVE SLEEP APNEA): ICD-10-CM

## 2021-06-26 PROCEDURE — 95810 POLYSOM 6/> YRS 4/> PARAM: CPT | Mod: 26,,, | Performed by: INTERNAL MEDICINE

## 2021-06-26 PROCEDURE — 95810 PR POLYSOMNOGRAPHY, 4 OR MORE: ICD-10-PCS | Mod: 26,,, | Performed by: INTERNAL MEDICINE

## 2021-06-26 PROCEDURE — 95810 POLYSOM 6/> YRS 4/> PARAM: CPT

## 2021-06-28 PROBLEM — A04.72 C. DIFFICILE COLITIS: Status: ACTIVE | Noted: 2021-06-28

## 2021-06-29 ENCOUNTER — OFFICE VISIT (OUTPATIENT)
Dept: CARDIOLOGY | Facility: CLINIC | Age: 26
End: 2021-06-29
Payer: COMMERCIAL

## 2021-06-29 VITALS
WEIGHT: 310.88 LBS | HEART RATE: 97 BPM | OXYGEN SATURATION: 97 % | BODY MASS INDEX: 45.9 KG/M2 | SYSTOLIC BLOOD PRESSURE: 118 MMHG | DIASTOLIC BLOOD PRESSURE: 78 MMHG

## 2021-06-29 DIAGNOSIS — K76.0 FATTY LIVER: ICD-10-CM

## 2021-06-29 DIAGNOSIS — R07.89 OTHER CHEST PAIN: ICD-10-CM

## 2021-06-29 DIAGNOSIS — K74.60 HEPATIC CIRRHOSIS, UNSPECIFIED HEPATIC CIRRHOSIS TYPE, UNSPECIFIED WHETHER ASCITES PRESENT: Primary | ICD-10-CM

## 2021-06-29 DIAGNOSIS — R94.39 ABNORMAL STRESS ECG WITH TREADMILL: ICD-10-CM

## 2021-06-29 DIAGNOSIS — G47.33 OSA (OBSTRUCTIVE SLEEP APNEA): ICD-10-CM

## 2021-06-29 DIAGNOSIS — R06.02 SOB (SHORTNESS OF BREATH): Primary | ICD-10-CM

## 2021-06-29 PROCEDURE — 3008F PR BODY MASS INDEX (BMI) DOCUMENTED: ICD-10-PCS | Mod: CPTII,S$GLB,, | Performed by: INTERNAL MEDICINE

## 2021-06-29 PROCEDURE — 99999 PR PBB SHADOW E&M-EST. PATIENT-LVL III: CPT | Mod: PBBFAC,,, | Performed by: INTERNAL MEDICINE

## 2021-06-29 PROCEDURE — 3008F BODY MASS INDEX DOCD: CPT | Mod: CPTII,S$GLB,, | Performed by: INTERNAL MEDICINE

## 2021-06-29 PROCEDURE — 99999 PR PBB SHADOW E&M-EST. PATIENT-LVL III: ICD-10-PCS | Mod: PBBFAC,,, | Performed by: INTERNAL MEDICINE

## 2021-06-29 PROCEDURE — 99215 OFFICE O/P EST HI 40 MIN: CPT | Mod: S$GLB,,, | Performed by: INTERNAL MEDICINE

## 2021-06-29 PROCEDURE — 99215 PR OFFICE/OUTPT VISIT, EST, LEVL V, 40-54 MIN: ICD-10-PCS | Mod: S$GLB,,, | Performed by: INTERNAL MEDICINE

## 2021-06-29 RX ORDER — ACETAMINOPHEN 325 MG/1
TABLET ORAL
COMMUNITY
End: 2021-09-07

## 2021-06-30 ENCOUNTER — TELEPHONE (OUTPATIENT)
Dept: PULMONOLOGY | Facility: CLINIC | Age: 26
End: 2021-06-30

## 2021-07-13 ENCOUNTER — TELEPHONE (OUTPATIENT)
Dept: ENDOSCOPY | Facility: HOSPITAL | Age: 26
End: 2021-07-13

## 2021-07-17 DIAGNOSIS — K76.0 FATTY LIVER: Primary | ICD-10-CM

## 2021-07-19 ENCOUNTER — TELEPHONE (OUTPATIENT)
Dept: GASTROENTEROLOGY | Facility: CLINIC | Age: 26
End: 2021-07-19

## 2021-07-20 ENCOUNTER — TELEPHONE (OUTPATIENT)
Dept: GASTROENTEROLOGY | Facility: CLINIC | Age: 26
End: 2021-07-20

## 2021-07-20 ENCOUNTER — PATIENT MESSAGE (OUTPATIENT)
Dept: GASTROENTEROLOGY | Facility: CLINIC | Age: 26
End: 2021-07-20

## 2021-07-20 ENCOUNTER — OFFICE VISIT (OUTPATIENT)
Dept: OPHTHALMOLOGY | Facility: CLINIC | Age: 26
End: 2021-07-20
Payer: COMMERCIAL

## 2021-07-20 DIAGNOSIS — H47.10 PAPILLEDEMA, BOTH EYES: Primary | ICD-10-CM

## 2021-07-20 DIAGNOSIS — H53.2 DIPLOPIA: ICD-10-CM

## 2021-07-20 PROCEDURE — 92004 COMPRE OPH EXAM NEW PT 1/>: CPT | Mod: S$GLB,,, | Performed by: STUDENT IN AN ORGANIZED HEALTH CARE EDUCATION/TRAINING PROGRAM

## 2021-07-20 PROCEDURE — 99999 PR PBB SHADOW E&M-EST. PATIENT-LVL II: ICD-10-PCS | Mod: PBBFAC,,, | Performed by: STUDENT IN AN ORGANIZED HEALTH CARE EDUCATION/TRAINING PROGRAM

## 2021-07-20 PROCEDURE — 92004 PR EYE EXAM, NEW PATIENT,COMPREHESV: ICD-10-PCS | Mod: S$GLB,,, | Performed by: STUDENT IN AN ORGANIZED HEALTH CARE EDUCATION/TRAINING PROGRAM

## 2021-07-20 PROCEDURE — 99999 PR PBB SHADOW E&M-EST. PATIENT-LVL II: CPT | Mod: PBBFAC,,, | Performed by: STUDENT IN AN ORGANIZED HEALTH CARE EDUCATION/TRAINING PROGRAM

## 2021-07-21 ENCOUNTER — HOSPITAL ENCOUNTER (OUTPATIENT)
Dept: RADIOLOGY | Facility: HOSPITAL | Age: 26
Discharge: HOME | End: 2021-07-21
Attending: STUDENT IN AN ORGANIZED HEALTH CARE EDUCATION/TRAINING PROGRAM
Payer: COMMERCIAL

## 2021-07-21 ENCOUNTER — PATIENT MESSAGE (OUTPATIENT)
Dept: OPHTHALMOLOGY | Facility: CLINIC | Age: 26
End: 2021-07-21

## 2021-07-21 ENCOUNTER — TELEPHONE (OUTPATIENT)
Dept: GASTROENTEROLOGY | Facility: CLINIC | Age: 26
End: 2021-07-21

## 2021-07-21 DIAGNOSIS — H53.2 DIPLOPIA: ICD-10-CM

## 2021-07-21 DIAGNOSIS — H47.10 PAPILLEDEMA, BOTH EYES: ICD-10-CM

## 2021-07-21 PROCEDURE — 70544 MR ANGIOGRAPHY HEAD W/O DYE: CPT | Mod: TC

## 2021-07-21 PROCEDURE — 70553 MRI BRAIN W WO CONTRAST: ICD-10-PCS | Mod: 26,,, | Performed by: RADIOLOGY

## 2021-07-21 PROCEDURE — 70553 MRI BRAIN STEM W/O & W/DYE: CPT | Mod: TC

## 2021-07-21 PROCEDURE — A9585 GADOBUTROL INJECTION: HCPCS | Performed by: STUDENT IN AN ORGANIZED HEALTH CARE EDUCATION/TRAINING PROGRAM

## 2021-07-21 PROCEDURE — 70553 MRI BRAIN STEM W/O & W/DYE: CPT | Mod: 26,,, | Performed by: RADIOLOGY

## 2021-07-21 PROCEDURE — 70544 MRV BRAIN WITHOUT CONTRAST: ICD-10-PCS | Mod: 26,59,, | Performed by: RADIOLOGY

## 2021-07-21 PROCEDURE — 70544 MR ANGIOGRAPHY HEAD W/O DYE: CPT | Mod: 26,59,, | Performed by: RADIOLOGY

## 2021-07-21 PROCEDURE — 25500020 PHARM REV CODE 255: Performed by: STUDENT IN AN ORGANIZED HEALTH CARE EDUCATION/TRAINING PROGRAM

## 2021-07-21 RX ORDER — GADOBUTROL 604.72 MG/ML
10 INJECTION INTRAVENOUS
Status: COMPLETED | OUTPATIENT
Start: 2021-07-21 | End: 2021-07-21

## 2021-07-21 RX ADMIN — GADOBUTROL 10 ML: 604.72 INJECTION INTRAVENOUS at 06:07

## 2021-07-27 RX ORDER — METOPROLOL TARTRATE 50 MG/1
TABLET ORAL
Qty: 3 TABLET | Refills: 0 | Status: SHIPPED | OUTPATIENT
Start: 2021-07-27 | End: 2021-09-07

## 2021-07-28 ENCOUNTER — HOSPITAL ENCOUNTER (OUTPATIENT)
Dept: RADIOLOGY | Facility: HOSPITAL | Age: 26
Discharge: HOME | End: 2021-07-28
Attending: INTERNAL MEDICINE
Payer: COMMERCIAL

## 2021-07-28 VITALS
OXYGEN SATURATION: 99 % | HEART RATE: 78 BPM | DIASTOLIC BLOOD PRESSURE: 78 MMHG | RESPIRATION RATE: 18 BRPM | SYSTOLIC BLOOD PRESSURE: 142 MMHG

## 2021-07-28 DIAGNOSIS — R94.39 ABNORMAL STRESS ECG WITH TREADMILL: ICD-10-CM

## 2021-07-28 PROCEDURE — 25000003 PHARM REV CODE 250: Performed by: INTERNAL MEDICINE

## 2021-07-28 PROCEDURE — 25500020 PHARM REV CODE 255: Performed by: INTERNAL MEDICINE

## 2021-07-28 PROCEDURE — 25000242 PHARM REV CODE 250 ALT 637 W/ HCPCS: Performed by: INTERNAL MEDICINE

## 2021-07-28 PROCEDURE — 75574 CT ANGIO HRT W/3D IMAGE: CPT | Mod: TC

## 2021-07-28 PROCEDURE — 75574 CTA CARDIAC: ICD-10-PCS | Mod: 26,,, | Performed by: RADIOLOGY

## 2021-07-28 PROCEDURE — 75574 CT ANGIO HRT W/3D IMAGE: CPT | Mod: 26,,, | Performed by: RADIOLOGY

## 2021-07-28 RX ORDER — NITROGLYCERIN 0.4 MG/1
0.4 TABLET SUBLINGUAL EVERY 5 MIN PRN
Status: DISCONTINUED | OUTPATIENT
Start: 2021-07-28 | End: 2021-07-29 | Stop reason: HOSPADM

## 2021-07-28 RX ORDER — METOPROLOL TARTRATE 1 MG/ML
30 INJECTION, SOLUTION INTRAVENOUS EVERY 5 MIN PRN
Status: DISCONTINUED | OUTPATIENT
Start: 2021-07-28 | End: 2021-07-29 | Stop reason: HOSPADM

## 2021-07-28 RX ADMIN — IOHEXOL 100 ML: 350 INJECTION, SOLUTION INTRAVENOUS at 11:07

## 2021-07-28 RX ADMIN — NITROGLYCERIN 0.4 MG: 0.4 TABLET SUBLINGUAL at 10:07

## 2021-07-28 RX ADMIN — METOPROLOL TARTRATE 30 MG: 5 INJECTION, SOLUTION INTRAVENOUS at 10:07

## 2021-07-29 ENCOUNTER — TELEPHONE (OUTPATIENT)
Dept: CARDIOLOGY | Facility: CLINIC | Age: 26
End: 2021-07-29

## 2021-07-29 ENCOUNTER — OFFICE VISIT (OUTPATIENT)
Dept: UROLOGY | Facility: CLINIC | Age: 26
End: 2021-07-29
Payer: COMMERCIAL

## 2021-07-29 ENCOUNTER — PATIENT MESSAGE (OUTPATIENT)
Dept: GASTROENTEROLOGY | Facility: CLINIC | Age: 26
End: 2021-07-29

## 2021-07-29 ENCOUNTER — LAB VISIT (OUTPATIENT)
Dept: LAB | Facility: HOSPITAL | Age: 26
End: 2021-07-29
Attending: INTERNAL MEDICINE
Payer: COMMERCIAL

## 2021-07-29 VITALS — BODY MASS INDEX: 46.13 KG/M2 | WEIGHT: 312.38 LBS

## 2021-07-29 DIAGNOSIS — R10.32 LEFT LOWER QUADRANT PAIN: ICD-10-CM

## 2021-07-29 DIAGNOSIS — N50.812 PAIN IN BOTH TESTICLES: ICD-10-CM

## 2021-07-29 DIAGNOSIS — N53.12 PAINFUL EJACULATION: Primary | ICD-10-CM

## 2021-07-29 DIAGNOSIS — R30.0 DYSURIA: ICD-10-CM

## 2021-07-29 DIAGNOSIS — R19.7 DIARRHEA, UNSPECIFIED TYPE: Primary | ICD-10-CM

## 2021-07-29 DIAGNOSIS — N50.811 PAIN IN BOTH TESTICLES: ICD-10-CM

## 2021-07-29 DIAGNOSIS — R19.7 DIARRHEA, UNSPECIFIED TYPE: ICD-10-CM

## 2021-07-29 PROCEDURE — 99999 PR PBB SHADOW E&M-EST. PATIENT-LVL III: ICD-10-PCS | Mod: PBBFAC,,, | Performed by: UROLOGY

## 2021-07-29 PROCEDURE — 99204 PR OFFICE/OUTPT VISIT, NEW, LEVL IV, 45-59 MIN: ICD-10-PCS | Mod: S$GLB,,, | Performed by: UROLOGY

## 2021-07-29 PROCEDURE — 3008F PR BODY MASS INDEX (BMI) DOCUMENTED: ICD-10-PCS | Mod: CPTII,S$GLB,, | Performed by: UROLOGY

## 2021-07-29 PROCEDURE — 99999 PR PBB SHADOW E&M-EST. PATIENT-LVL III: CPT | Mod: PBBFAC,,, | Performed by: UROLOGY

## 2021-07-29 PROCEDURE — 1125F PR PAIN SEVERITY QUANTIFIED, PAIN PRESENT: ICD-10-PCS | Mod: CPTII,S$GLB,, | Performed by: UROLOGY

## 2021-07-29 PROCEDURE — 1159F MED LIST DOCD IN RCRD: CPT | Mod: CPTII,S$GLB,, | Performed by: UROLOGY

## 2021-07-29 PROCEDURE — 99204 OFFICE O/P NEW MOD 45 MIN: CPT | Mod: S$GLB,,, | Performed by: UROLOGY

## 2021-07-29 PROCEDURE — 1159F PR MEDICATION LIST DOCUMENTED IN MEDICAL RECORD: ICD-10-PCS | Mod: CPTII,S$GLB,, | Performed by: UROLOGY

## 2021-07-29 PROCEDURE — 3008F BODY MASS INDEX DOCD: CPT | Mod: CPTII,S$GLB,, | Performed by: UROLOGY

## 2021-07-29 PROCEDURE — 1125F AMNT PAIN NOTED PAIN PRSNT: CPT | Mod: CPTII,S$GLB,, | Performed by: UROLOGY

## 2021-07-29 PROCEDURE — 1160F PR REVIEW ALL MEDS BY PRESCRIBER/CLIN PHARMACIST DOCUMENTED: ICD-10-PCS | Mod: CPTII,S$GLB,, | Performed by: UROLOGY

## 2021-07-29 PROCEDURE — 1160F RVW MEDS BY RX/DR IN RCRD: CPT | Mod: CPTII,S$GLB,, | Performed by: UROLOGY

## 2021-07-31 ENCOUNTER — HOSPITAL ENCOUNTER (EMERGENCY)
Facility: HOSPITAL | Age: 26
Discharge: HOME | End: 2021-07-31
Attending: FAMILY MEDICINE
Payer: COMMERCIAL

## 2021-07-31 VITALS
TEMPERATURE: 98 F | DIASTOLIC BLOOD PRESSURE: 82 MMHG | BODY MASS INDEX: 46.33 KG/M2 | RESPIRATION RATE: 20 BRPM | HEIGHT: 69 IN | WEIGHT: 312.81 LBS | OXYGEN SATURATION: 98 % | SYSTOLIC BLOOD PRESSURE: 145 MMHG | HEART RATE: 94 BPM

## 2021-07-31 DIAGNOSIS — R06.02 SHORTNESS OF BREATH: Primary | ICD-10-CM

## 2021-07-31 LAB
CTP QC/QA: YES
SARS-COV-2 RDRP RESP QL NAA+PROBE: NEGATIVE

## 2021-07-31 PROCEDURE — U0002 COVID-19 LAB TEST NON-CDC: HCPCS | Performed by: FAMILY MEDICINE

## 2021-07-31 PROCEDURE — 99283 EMERGENCY DEPT VISIT LOW MDM: CPT | Mod: 25

## 2021-08-02 ENCOUNTER — PATIENT MESSAGE (OUTPATIENT)
Dept: GASTROENTEROLOGY | Facility: CLINIC | Age: 26
End: 2021-08-02

## 2021-08-02 RX ORDER — VANCOMYCIN HYDROCHLORIDE 125 MG/1
CAPSULE ORAL
Qty: 84 CAPSULE | Refills: 0 | Status: SHIPPED | OUTPATIENT
Start: 2021-08-02 | End: 2021-09-13

## 2021-08-03 ENCOUNTER — TELEPHONE (OUTPATIENT)
Dept: RADIOLOGY | Facility: HOSPITAL | Age: 26
End: 2021-08-03

## 2021-08-03 ENCOUNTER — PATIENT MESSAGE (OUTPATIENT)
Dept: HEPATOLOGY | Facility: CLINIC | Age: 26
End: 2021-08-03

## 2021-08-03 DIAGNOSIS — R19.7 DIARRHEA, UNSPECIFIED TYPE: Primary | ICD-10-CM

## 2021-08-04 ENCOUNTER — HOSPITAL ENCOUNTER (OUTPATIENT)
Dept: RADIOLOGY | Facility: HOSPITAL | Age: 26
Discharge: HOME | End: 2021-08-04
Attending: UROLOGY
Payer: COMMERCIAL

## 2021-08-04 ENCOUNTER — LAB VISIT (OUTPATIENT)
Dept: LAB | Facility: HOSPITAL | Age: 26
End: 2021-08-04
Attending: INTERNAL MEDICINE
Payer: COMMERCIAL

## 2021-08-04 DIAGNOSIS — N50.812 PAIN IN BOTH TESTICLES: ICD-10-CM

## 2021-08-04 DIAGNOSIS — R19.7 DIARRHEA, UNSPECIFIED TYPE: ICD-10-CM

## 2021-08-04 DIAGNOSIS — N50.811 PAIN IN BOTH TESTICLES: ICD-10-CM

## 2021-08-04 PROCEDURE — 76870 US SCROTUM AND TESTICLES: ICD-10-PCS | Mod: 26,,, | Performed by: RADIOLOGY

## 2021-08-04 PROCEDURE — 76870 US EXAM SCROTUM: CPT | Mod: 26,,, | Performed by: RADIOLOGY

## 2021-08-04 PROCEDURE — 83516 IMMUNOASSAY NONANTIBODY: CPT | Performed by: INTERNAL MEDICINE

## 2021-08-04 PROCEDURE — 36415 COLL VENOUS BLD VENIPUNCTURE: CPT | Performed by: INTERNAL MEDICINE

## 2021-08-04 PROCEDURE — 76870 US EXAM SCROTUM: CPT | Mod: TC

## 2021-08-09 LAB
GLIADIN PEPTIDE IGA SER-ACNC: 6 UNITS
GLIADIN PEPTIDE IGG SER-ACNC: 2 UNITS
IGA SERPL-MCNC: 140 MG/DL (ref 70–400)
TTG IGA SER-ACNC: 6 UNITS
TTG IGG SER-ACNC: 3 UNITS

## 2021-08-27 ENCOUNTER — TELEPHONE (OUTPATIENT)
Dept: UROLOGY | Facility: CLINIC | Age: 26
End: 2021-08-27

## 2021-09-01 ENCOUNTER — PATIENT MESSAGE (OUTPATIENT)
Dept: GASTROENTEROLOGY | Facility: CLINIC | Age: 26
End: 2021-09-01

## 2021-09-07 ENCOUNTER — OFFICE VISIT (OUTPATIENT)
Dept: GASTROENTEROLOGY | Facility: CLINIC | Age: 26
End: 2021-09-07
Payer: COMMERCIAL

## 2021-09-07 VITALS — HEIGHT: 69 IN | BODY MASS INDEX: 45.72 KG/M2 | HEART RATE: 82 BPM | WEIGHT: 308.69 LBS

## 2021-09-07 DIAGNOSIS — K74.60 HEPATIC CIRRHOSIS, UNSPECIFIED HEPATIC CIRRHOSIS TYPE, UNSPECIFIED WHETHER ASCITES PRESENT: ICD-10-CM

## 2021-09-07 DIAGNOSIS — R10.9 RIGHT SIDED ABDOMINAL PAIN: ICD-10-CM

## 2021-09-07 DIAGNOSIS — A49.8 CLOSTRIDIUM DIFFICILE INFECTION: Primary | ICD-10-CM

## 2021-09-07 PROCEDURE — 99214 PR OFFICE/OUTPT VISIT, EST, LEVL IV, 30-39 MIN: ICD-10-PCS | Mod: S$GLB,,, | Performed by: NURSE PRACTITIONER

## 2021-09-07 PROCEDURE — 1159F PR MEDICATION LIST DOCUMENTED IN MEDICAL RECORD: ICD-10-PCS | Mod: CPTII,S$GLB,, | Performed by: NURSE PRACTITIONER

## 2021-09-07 PROCEDURE — 99214 OFFICE O/P EST MOD 30 MIN: CPT | Mod: S$GLB,,, | Performed by: NURSE PRACTITIONER

## 2021-09-07 PROCEDURE — 3008F BODY MASS INDEX DOCD: CPT | Mod: CPTII,S$GLB,, | Performed by: NURSE PRACTITIONER

## 2021-09-07 PROCEDURE — 1160F PR REVIEW ALL MEDS BY PRESCRIBER/CLIN PHARMACIST DOCUMENTED: ICD-10-PCS | Mod: CPTII,S$GLB,, | Performed by: NURSE PRACTITIONER

## 2021-09-07 PROCEDURE — 99999 PR PBB SHADOW E&M-EST. PATIENT-LVL III: CPT | Mod: PBBFAC,,, | Performed by: NURSE PRACTITIONER

## 2021-09-07 PROCEDURE — 3008F PR BODY MASS INDEX (BMI) DOCUMENTED: ICD-10-PCS | Mod: CPTII,S$GLB,, | Performed by: NURSE PRACTITIONER

## 2021-09-07 PROCEDURE — 1159F MED LIST DOCD IN RCRD: CPT | Mod: CPTII,S$GLB,, | Performed by: NURSE PRACTITIONER

## 2021-09-07 PROCEDURE — 99999 PR PBB SHADOW E&M-EST. PATIENT-LVL III: ICD-10-PCS | Mod: PBBFAC,,, | Performed by: NURSE PRACTITIONER

## 2021-09-07 PROCEDURE — 1160F RVW MEDS BY RX/DR IN RCRD: CPT | Mod: CPTII,S$GLB,, | Performed by: NURSE PRACTITIONER

## 2021-09-13 ENCOUNTER — OFFICE VISIT (OUTPATIENT)
Dept: PHYSICAL MEDICINE AND REHAB | Facility: CLINIC | Age: 26
End: 2021-09-13
Payer: COMMERCIAL

## 2021-09-13 ENCOUNTER — HOSPITAL ENCOUNTER (OUTPATIENT)
Dept: RADIOLOGY | Facility: HOSPITAL | Age: 26
Discharge: HOME | End: 2021-09-13
Attending: PHYSICAL MEDICINE & REHABILITATION
Payer: COMMERCIAL

## 2021-09-13 VITALS
BODY MASS INDEX: 45.62 KG/M2 | HEART RATE: 114 BPM | HEIGHT: 69 IN | SYSTOLIC BLOOD PRESSURE: 162 MMHG | RESPIRATION RATE: 14 BRPM | WEIGHT: 308 LBS | DIASTOLIC BLOOD PRESSURE: 89 MMHG

## 2021-09-13 DIAGNOSIS — M79.18 CERVICAL MYOFASCIAL PAIN SYNDROME: ICD-10-CM

## 2021-09-13 DIAGNOSIS — R20.2 PARESTHESIAS: ICD-10-CM

## 2021-09-13 DIAGNOSIS — M79.18 CERVICAL MYOFASCIAL PAIN SYNDROME: Primary | ICD-10-CM

## 2021-09-13 PROCEDURE — 72050 X-RAY EXAM NECK SPINE 4/5VWS: CPT | Mod: TC

## 2021-09-13 PROCEDURE — 1159F MED LIST DOCD IN RCRD: CPT | Mod: CPTII,S$GLB,, | Performed by: PHYSICAL MEDICINE & REHABILITATION

## 2021-09-13 PROCEDURE — 3077F SYST BP >= 140 MM HG: CPT | Mod: CPTII,S$GLB,, | Performed by: PHYSICAL MEDICINE & REHABILITATION

## 2021-09-13 PROCEDURE — 3079F DIAST BP 80-89 MM HG: CPT | Mod: CPTII,S$GLB,, | Performed by: PHYSICAL MEDICINE & REHABILITATION

## 2021-09-13 PROCEDURE — 1160F PR REVIEW ALL MEDS BY PRESCRIBER/CLIN PHARMACIST DOCUMENTED: ICD-10-PCS | Mod: CPTII,S$GLB,, | Performed by: PHYSICAL MEDICINE & REHABILITATION

## 2021-09-13 PROCEDURE — 3079F PR MOST RECENT DIASTOLIC BLOOD PRESSURE 80-89 MM HG: ICD-10-PCS | Mod: CPTII,S$GLB,, | Performed by: PHYSICAL MEDICINE & REHABILITATION

## 2021-09-13 PROCEDURE — 99999 PR PBB SHADOW E&M-EST. PATIENT-LVL IV: CPT | Mod: PBBFAC,,, | Performed by: PHYSICAL MEDICINE & REHABILITATION

## 2021-09-13 PROCEDURE — 3008F BODY MASS INDEX DOCD: CPT | Mod: CPTII,S$GLB,, | Performed by: PHYSICAL MEDICINE & REHABILITATION

## 2021-09-13 PROCEDURE — 72050 X-RAY EXAM NECK SPINE 4/5VWS: CPT | Mod: 26,,, | Performed by: RADIOLOGY

## 2021-09-13 PROCEDURE — 1160F RVW MEDS BY RX/DR IN RCRD: CPT | Mod: CPTII,S$GLB,, | Performed by: PHYSICAL MEDICINE & REHABILITATION

## 2021-09-13 PROCEDURE — 1159F PR MEDICATION LIST DOCUMENTED IN MEDICAL RECORD: ICD-10-PCS | Mod: CPTII,S$GLB,, | Performed by: PHYSICAL MEDICINE & REHABILITATION

## 2021-09-13 PROCEDURE — 3008F PR BODY MASS INDEX (BMI) DOCUMENTED: ICD-10-PCS | Mod: CPTII,S$GLB,, | Performed by: PHYSICAL MEDICINE & REHABILITATION

## 2021-09-13 PROCEDURE — 72050 XR CERVICAL SPINE COMPLETE 5 VIEW: ICD-10-PCS | Mod: 26,,, | Performed by: RADIOLOGY

## 2021-09-13 PROCEDURE — 3077F PR MOST RECENT SYSTOLIC BLOOD PRESSURE >= 140 MM HG: ICD-10-PCS | Mod: CPTII,S$GLB,, | Performed by: PHYSICAL MEDICINE & REHABILITATION

## 2021-09-13 PROCEDURE — 99203 OFFICE O/P NEW LOW 30 MIN: CPT | Mod: S$GLB,,, | Performed by: PHYSICAL MEDICINE & REHABILITATION

## 2021-09-13 PROCEDURE — 99999 PR PBB SHADOW E&M-EST. PATIENT-LVL IV: ICD-10-PCS | Mod: PBBFAC,,, | Performed by: PHYSICAL MEDICINE & REHABILITATION

## 2021-09-13 PROCEDURE — 99203 PR OFFICE/OUTPT VISIT, NEW, LEVL III, 30-44 MIN: ICD-10-PCS | Mod: S$GLB,,, | Performed by: PHYSICAL MEDICINE & REHABILITATION

## 2021-09-13 RX ORDER — QUETIAPINE FUMARATE 100 MG/1
300 TABLET, FILM COATED ORAL DAILY
COMMUNITY
Start: 2021-09-10 | End: 2024-03-28

## 2021-09-16 ENCOUNTER — PATIENT MESSAGE (OUTPATIENT)
Dept: ENDOSCOPY | Facility: HOSPITAL | Age: 26
End: 2021-09-16

## 2021-09-16 DIAGNOSIS — Z01.818 PRE-OP TESTING: Primary | ICD-10-CM

## 2021-09-17 ENCOUNTER — LAB VISIT (OUTPATIENT)
Dept: PRIMARY CARE CLINIC | Facility: CLINIC | Age: 26
End: 2021-09-17
Payer: COMMERCIAL

## 2021-09-17 DIAGNOSIS — Z01.818 PRE-OP TESTING: ICD-10-CM

## 2021-09-17 PROCEDURE — U0005 INFEC AGEN DETEC AMPLI PROBE: HCPCS | Performed by: INTERNAL MEDICINE

## 2021-09-17 PROCEDURE — U0003 INFECTIOUS AGENT DETECTION BY NUCLEIC ACID (DNA OR RNA); SEVERE ACUTE RESPIRATORY SYNDROME CORONAVIRUS 2 (SARS-COV-2) (CORONAVIRUS DISEASE [COVID-19]), AMPLIFIED PROBE TECHNIQUE, MAKING USE OF HIGH THROUGHPUT TECHNOLOGIES AS DESCRIBED BY CMS-2020-01-R: HCPCS | Performed by: INTERNAL MEDICINE

## 2021-09-18 LAB
SARS-COV-2 RNA RESP QL NAA+PROBE: NOT DETECTED
SARS-COV-2- CYCLE NUMBER: NORMAL

## 2021-09-20 ENCOUNTER — ANESTHESIA (OUTPATIENT)
Dept: ENDOSCOPY | Facility: HOSPITAL | Age: 26
End: 2021-09-20
Payer: COMMERCIAL

## 2021-09-20 ENCOUNTER — HOSPITAL ENCOUNTER (OUTPATIENT)
Facility: HOSPITAL | Age: 26
Discharge: HOME | End: 2021-09-20
Attending: INTERNAL MEDICINE | Admitting: INTERNAL MEDICINE
Payer: COMMERCIAL

## 2021-09-20 ENCOUNTER — ANESTHESIA EVENT (OUTPATIENT)
Dept: ENDOSCOPY | Facility: HOSPITAL | Age: 26
End: 2021-09-20
Payer: COMMERCIAL

## 2021-09-20 DIAGNOSIS — K76.0 HEPATIC STEATOSIS: Primary | ICD-10-CM

## 2021-09-20 PROCEDURE — 37000009 HC ANESTHESIA EA ADD 15 MINS: Performed by: INTERNAL MEDICINE

## 2021-09-20 PROCEDURE — 43238 PR UPGI ENDOSCOPY W/US FN BX: ICD-10-PCS | Mod: ,,, | Performed by: INTERNAL MEDICINE

## 2021-09-20 PROCEDURE — 63600175 PHARM REV CODE 636 W HCPCS: Performed by: NURSE ANESTHETIST, CERTIFIED REGISTERED

## 2021-09-20 PROCEDURE — 88307 PR  SURG PATH,LEVEL V: ICD-10-PCS | Mod: 26,,, | Performed by: PATHOLOGY

## 2021-09-20 PROCEDURE — 37000008 HC ANESTHESIA 1ST 15 MINUTES: Performed by: INTERNAL MEDICINE

## 2021-09-20 PROCEDURE — 88313 SPECIAL STAINS GROUP 2: CPT | Mod: 26,,, | Performed by: PATHOLOGY

## 2021-09-20 PROCEDURE — 25000003 PHARM REV CODE 250: Performed by: NURSE ANESTHETIST, CERTIFIED REGISTERED

## 2021-09-20 PROCEDURE — 43238 EGD US FINE NEEDLE BX/ASPIR: CPT | Mod: ,,, | Performed by: INTERNAL MEDICINE

## 2021-09-20 PROCEDURE — 88313 PR  SPECIAL STAINS,GROUP II: ICD-10-PCS | Mod: 26,,, | Performed by: PATHOLOGY

## 2021-09-20 PROCEDURE — 43238 EGD US FINE NEEDLE BX/ASPIR: CPT | Performed by: INTERNAL MEDICINE

## 2021-09-20 PROCEDURE — 88313 SPECIAL STAINS GROUP 2: CPT | Performed by: PATHOLOGY

## 2021-09-20 PROCEDURE — 27202131 HC NEEDLE, FNB SINGLE (ANY): Performed by: INTERNAL MEDICINE

## 2021-09-20 PROCEDURE — 88307 TISSUE EXAM BY PATHOLOGIST: CPT | Performed by: PATHOLOGY

## 2021-09-20 PROCEDURE — 88307 TISSUE EXAM BY PATHOLOGIST: CPT | Mod: 26,,, | Performed by: PATHOLOGY

## 2021-09-20 RX ORDER — PROPOFOL 10 MG/ML
VIAL (ML) INTRAVENOUS
Status: DISCONTINUED | OUTPATIENT
Start: 2021-09-20 | End: 2021-09-20

## 2021-09-20 RX ORDER — SODIUM CHLORIDE, SODIUM LACTATE, POTASSIUM CHLORIDE, CALCIUM CHLORIDE 600; 310; 30; 20 MG/100ML; MG/100ML; MG/100ML; MG/100ML
INJECTION, SOLUTION INTRAVENOUS CONTINUOUS PRN
Status: DISCONTINUED | OUTPATIENT
Start: 2021-09-20 | End: 2021-09-20

## 2021-09-20 RX ORDER — LIDOCAINE HYDROCHLORIDE 10 MG/ML
INJECTION, SOLUTION EPIDURAL; INFILTRATION; INTRACAUDAL; PERINEURAL
Status: DISCONTINUED | OUTPATIENT
Start: 2021-09-20 | End: 2021-09-20

## 2021-09-20 RX ADMIN — SODIUM CHLORIDE, SODIUM LACTATE, POTASSIUM CHLORIDE, AND CALCIUM CHLORIDE: 600; 310; 30; 20 INJECTION, SOLUTION INTRAVENOUS at 10:09

## 2021-09-20 RX ADMIN — PROPOFOL 50 MG: 10 INJECTION, EMULSION INTRAVENOUS at 10:09

## 2021-09-20 RX ADMIN — LIDOCAINE HYDROCHLORIDE 50 MG: 10 INJECTION, SOLUTION EPIDURAL; INFILTRATION; INTRACAUDAL; PERINEURAL at 10:09

## 2021-09-20 RX ADMIN — PROPOFOL 70 MG: 10 INJECTION, EMULSION INTRAVENOUS at 10:09

## 2021-09-20 RX ADMIN — PROPOFOL 60 MG: 10 INJECTION, EMULSION INTRAVENOUS at 10:09

## 2021-09-23 VITALS
RESPIRATION RATE: 14 BRPM | TEMPERATURE: 97 F | OXYGEN SATURATION: 97 % | SYSTOLIC BLOOD PRESSURE: 136 MMHG | HEART RATE: 90 BPM | DIASTOLIC BLOOD PRESSURE: 74 MMHG

## 2021-09-24 LAB
COMMENT: NORMAL
FINAL PATHOLOGIC DIAGNOSIS: NORMAL
GROSS: NORMAL
Lab: NORMAL

## 2021-10-08 ENCOUNTER — IMMUNIZATION (OUTPATIENT)
Dept: PRIMARY CARE CLINIC | Facility: CLINIC | Age: 26
End: 2021-10-08
Payer: COMMERCIAL

## 2021-10-08 DIAGNOSIS — Z23 NEED FOR VACCINATION: Primary | ICD-10-CM

## 2021-10-08 PROCEDURE — 91300 COVID-19, MRNA, LNP-S, PF, 30 MCG/0.3 ML DOSE VACCINE: CPT | Mod: PBBFAC | Performed by: FAMILY MEDICINE

## 2021-10-08 PROCEDURE — 0003A COVID-19, MRNA, LNP-S, PF, 30 MCG/0.3 ML DOSE VACCINE: CPT | Mod: CV19,PBBFAC | Performed by: FAMILY MEDICINE

## 2021-10-21 ENCOUNTER — OFFICE VISIT (OUTPATIENT)
Dept: NEUROLOGY | Facility: CLINIC | Age: 26
End: 2021-10-21
Payer: COMMERCIAL

## 2021-10-21 VITALS
BODY MASS INDEX: 46.65 KG/M2 | HEIGHT: 69 IN | DIASTOLIC BLOOD PRESSURE: 84 MMHG | WEIGHT: 315 LBS | SYSTOLIC BLOOD PRESSURE: 135 MMHG | HEART RATE: 96 BPM

## 2021-10-21 DIAGNOSIS — R20.2 PARESTHESIAS: ICD-10-CM

## 2021-10-21 DIAGNOSIS — M54.12 RADICULOPATHY, CERVICAL REGION: ICD-10-CM

## 2021-10-21 DIAGNOSIS — M47.22 CERVICAL SPONDYLOSIS WITH RADICULOPATHY: Primary | ICD-10-CM

## 2021-10-21 DIAGNOSIS — G43.109 MIGRAINE EQUIVALENT SYNDROME: ICD-10-CM

## 2021-10-21 PROCEDURE — 99999 PR PBB SHADOW E&M-EST. PATIENT-LVL III: ICD-10-PCS | Mod: PBBFAC,,, | Performed by: PSYCHIATRY & NEUROLOGY

## 2021-10-21 PROCEDURE — 1159F PR MEDICATION LIST DOCUMENTED IN MEDICAL RECORD: ICD-10-PCS | Mod: CPTII,S$GLB,, | Performed by: PSYCHIATRY & NEUROLOGY

## 2021-10-21 PROCEDURE — 3075F PR MOST RECENT SYSTOLIC BLOOD PRESS GE 130-139MM HG: ICD-10-PCS | Mod: CPTII,S$GLB,, | Performed by: PSYCHIATRY & NEUROLOGY

## 2021-10-21 PROCEDURE — 99204 OFFICE O/P NEW MOD 45 MIN: CPT | Mod: S$GLB,,, | Performed by: PSYCHIATRY & NEUROLOGY

## 2021-10-21 PROCEDURE — 3008F PR BODY MASS INDEX (BMI) DOCUMENTED: ICD-10-PCS | Mod: CPTII,S$GLB,, | Performed by: PSYCHIATRY & NEUROLOGY

## 2021-10-21 PROCEDURE — 3079F PR MOST RECENT DIASTOLIC BLOOD PRESSURE 80-89 MM HG: ICD-10-PCS | Mod: CPTII,S$GLB,, | Performed by: PSYCHIATRY & NEUROLOGY

## 2021-10-21 PROCEDURE — 3008F BODY MASS INDEX DOCD: CPT | Mod: CPTII,S$GLB,, | Performed by: PSYCHIATRY & NEUROLOGY

## 2021-10-21 PROCEDURE — 3079F DIAST BP 80-89 MM HG: CPT | Mod: CPTII,S$GLB,, | Performed by: PSYCHIATRY & NEUROLOGY

## 2021-10-21 PROCEDURE — 99999 PR PBB SHADOW E&M-EST. PATIENT-LVL III: CPT | Mod: PBBFAC,,, | Performed by: PSYCHIATRY & NEUROLOGY

## 2021-10-21 PROCEDURE — 3075F SYST BP GE 130 - 139MM HG: CPT | Mod: CPTII,S$GLB,, | Performed by: PSYCHIATRY & NEUROLOGY

## 2021-10-21 PROCEDURE — 1159F MED LIST DOCD IN RCRD: CPT | Mod: CPTII,S$GLB,, | Performed by: PSYCHIATRY & NEUROLOGY

## 2021-10-21 PROCEDURE — 99204 PR OFFICE/OUTPT VISIT, NEW, LEVL IV, 45-59 MIN: ICD-10-PCS | Mod: S$GLB,,, | Performed by: PSYCHIATRY & NEUROLOGY

## 2021-11-05 ENCOUNTER — HOSPITAL ENCOUNTER (OUTPATIENT)
Dept: RADIOLOGY | Facility: HOSPITAL | Age: 26
Discharge: HOME | End: 2021-11-05
Attending: PSYCHIATRY & NEUROLOGY
Payer: COMMERCIAL

## 2021-11-05 DIAGNOSIS — M54.12 RADICULOPATHY, CERVICAL REGION: ICD-10-CM

## 2021-11-05 PROCEDURE — 72141 MRI NECK SPINE W/O DYE: CPT | Mod: TC

## 2021-11-05 PROCEDURE — 72141 MRI CERVICAL SPINE WITHOUT CONTRAST: ICD-10-PCS | Mod: 26,,, | Performed by: RADIOLOGY

## 2021-11-05 PROCEDURE — 72141 MRI NECK SPINE W/O DYE: CPT | Mod: 26,,, | Performed by: RADIOLOGY

## 2021-12-21 ENCOUNTER — PROCEDURE VISIT (OUTPATIENT)
Dept: NEUROLOGY | Facility: CLINIC | Age: 26
End: 2021-12-21
Payer: COMMERCIAL

## 2021-12-21 DIAGNOSIS — R20.0 NUMBNESS AND TINGLING: Primary | ICD-10-CM

## 2021-12-21 DIAGNOSIS — M47.22 CERVICAL SPONDYLOSIS WITH RADICULOPATHY: ICD-10-CM

## 2021-12-21 DIAGNOSIS — M54.12 RADICULOPATHY, CERVICAL REGION: ICD-10-CM

## 2021-12-21 DIAGNOSIS — R20.2 NUMBNESS AND TINGLING: Primary | ICD-10-CM

## 2021-12-21 DIAGNOSIS — R20.2 PARESTHESIAS: ICD-10-CM

## 2021-12-21 PROCEDURE — 95913 PR NERVE CONDUCTION STUDY; 13 OR MORE STUDIES: ICD-10-PCS | Mod: S$GLB,,, | Performed by: PSYCHIATRY & NEUROLOGY

## 2021-12-21 PROCEDURE — 95886 MUSC TEST DONE W/N TEST COMP: CPT | Mod: S$GLB,,, | Performed by: PSYCHIATRY & NEUROLOGY

## 2021-12-21 PROCEDURE — 95913 NRV CNDJ TEST 13/> STUDIES: CPT | Mod: S$GLB,,, | Performed by: PSYCHIATRY & NEUROLOGY

## 2021-12-21 PROCEDURE — 95886 PR EMG COMPLETE, W/ NERVE CONDUCTION STUDIES, 5+ MUSCLES: ICD-10-PCS | Mod: S$GLB,,, | Performed by: PSYCHIATRY & NEUROLOGY

## 2022-01-13 ENCOUNTER — PATIENT MESSAGE (OUTPATIENT)
Dept: GASTROENTEROLOGY | Facility: CLINIC | Age: 27
End: 2022-01-13
Payer: COMMERCIAL

## 2022-01-18 DIAGNOSIS — R19.7 DIARRHEA, UNSPECIFIED TYPE: Primary | ICD-10-CM

## 2022-01-24 ENCOUNTER — LAB VISIT (OUTPATIENT)
Dept: LAB | Facility: HOSPITAL | Age: 27
End: 2022-01-24
Attending: INTERNAL MEDICINE
Payer: COMMERCIAL

## 2022-01-24 DIAGNOSIS — R19.7 DIARRHEA, UNSPECIFIED TYPE: ICD-10-CM

## 2022-01-24 PROCEDURE — 87449 NOS EACH ORGANISM AG IA: CPT | Performed by: INTERNAL MEDICINE

## 2022-01-25 LAB
C DIFF GDH STL QL: NEGATIVE
C DIFF TOX A+B STL QL IA: NEGATIVE

## 2022-02-22 ENCOUNTER — TELEPHONE (OUTPATIENT)
Dept: SLEEP MEDICINE | Facility: CLINIC | Age: 27
End: 2022-02-22
Payer: COMMERCIAL

## 2022-02-22 NOTE — TELEPHONE ENCOUNTER
Reached out to pt to remind them of their appointment for 2/23/22 for 10:30 am. Also, reminded them to complete their sleep questionnaire prior to their visit and bring their sleep machine if they have one. Pt confirmed.

## 2022-02-23 ENCOUNTER — OFFICE VISIT (OUTPATIENT)
Dept: SLEEP MEDICINE | Facility: CLINIC | Age: 27
End: 2022-02-23
Payer: COMMERCIAL

## 2022-02-23 VITALS
HEIGHT: 69 IN | BODY MASS INDEX: 46.65 KG/M2 | DIASTOLIC BLOOD PRESSURE: 90 MMHG | HEART RATE: 100 BPM | SYSTOLIC BLOOD PRESSURE: 139 MMHG | WEIGHT: 315 LBS

## 2022-02-23 DIAGNOSIS — G47.30 SLEEP APNEA, UNSPECIFIED TYPE: Primary | ICD-10-CM

## 2022-02-23 PROCEDURE — 3075F PR MOST RECENT SYSTOLIC BLOOD PRESS GE 130-139MM HG: ICD-10-PCS | Mod: CPTII,S$GLB,, | Performed by: PSYCHIATRY & NEUROLOGY

## 2022-02-23 PROCEDURE — 3075F SYST BP GE 130 - 139MM HG: CPT | Mod: CPTII,S$GLB,, | Performed by: PSYCHIATRY & NEUROLOGY

## 2022-02-23 PROCEDURE — 3008F BODY MASS INDEX DOCD: CPT | Mod: CPTII,S$GLB,, | Performed by: PSYCHIATRY & NEUROLOGY

## 2022-02-23 PROCEDURE — 1159F PR MEDICATION LIST DOCUMENTED IN MEDICAL RECORD: ICD-10-PCS | Mod: CPTII,S$GLB,, | Performed by: PSYCHIATRY & NEUROLOGY

## 2022-02-23 PROCEDURE — 1159F MED LIST DOCD IN RCRD: CPT | Mod: CPTII,S$GLB,, | Performed by: PSYCHIATRY & NEUROLOGY

## 2022-02-23 PROCEDURE — 3080F PR MOST RECENT DIASTOLIC BLOOD PRESSURE >= 90 MM HG: ICD-10-PCS | Mod: CPTII,S$GLB,, | Performed by: PSYCHIATRY & NEUROLOGY

## 2022-02-23 PROCEDURE — 99204 OFFICE O/P NEW MOD 45 MIN: CPT | Mod: S$GLB,,, | Performed by: PSYCHIATRY & NEUROLOGY

## 2022-02-23 PROCEDURE — 99999 PR PBB SHADOW E&M-EST. PATIENT-LVL III: CPT | Mod: PBBFAC,,, | Performed by: PSYCHIATRY & NEUROLOGY

## 2022-02-23 PROCEDURE — 1160F PR REVIEW ALL MEDS BY PRESCRIBER/CLIN PHARMACIST DOCUMENTED: ICD-10-PCS | Mod: CPTII,S$GLB,, | Performed by: PSYCHIATRY & NEUROLOGY

## 2022-02-23 PROCEDURE — 3008F PR BODY MASS INDEX (BMI) DOCUMENTED: ICD-10-PCS | Mod: CPTII,S$GLB,, | Performed by: PSYCHIATRY & NEUROLOGY

## 2022-02-23 PROCEDURE — 3080F DIAST BP >= 90 MM HG: CPT | Mod: CPTII,S$GLB,, | Performed by: PSYCHIATRY & NEUROLOGY

## 2022-02-23 PROCEDURE — 99999 PR PBB SHADOW E&M-EST. PATIENT-LVL III: ICD-10-PCS | Mod: PBBFAC,,, | Performed by: PSYCHIATRY & NEUROLOGY

## 2022-02-23 PROCEDURE — 1160F RVW MEDS BY RX/DR IN RCRD: CPT | Mod: CPTII,S$GLB,, | Performed by: PSYCHIATRY & NEUROLOGY

## 2022-02-23 PROCEDURE — 99204 PR OFFICE/OUTPT VISIT, NEW, LEVL IV, 45-59 MIN: ICD-10-PCS | Mod: S$GLB,,, | Performed by: PSYCHIATRY & NEUROLOGY

## 2022-02-23 NOTE — PATIENT INSTRUCTIONS
SLEEP LAB ([facility] or [facility]) will contact you to schedulethe sleep study. Their number is [phone] (ext 2). Please call them if you do not hear from them in 2 weeks from now.  The [facility] Lab is located on 7th floor of the [facility];       SLEEP CLINIC (my assistant) will call you when the sleep study results are ready - if you have not heard from us by 2 weeks from the date of the study, please call [phone] (ext 1) or you can use [facility] to contact me.    You are advised to abstain from driving should you feel sleepy or drowsy.
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
## 2022-02-25 ENCOUNTER — TELEPHONE (OUTPATIENT)
Dept: SLEEP MEDICINE | Facility: OTHER | Age: 27
End: 2022-02-25
Payer: COMMERCIAL

## 2022-03-11 ENCOUNTER — TELEPHONE (OUTPATIENT)
Dept: SLEEP MEDICINE | Facility: OTHER | Age: 27
End: 2022-03-11
Payer: COMMERCIAL

## 2022-03-14 ENCOUNTER — TELEPHONE (OUTPATIENT)
Dept: SLEEP MEDICINE | Facility: OTHER | Age: 27
End: 2022-03-14
Payer: COMMERCIAL

## 2022-03-18 ENCOUNTER — TELEPHONE (OUTPATIENT)
Dept: SLEEP MEDICINE | Facility: OTHER | Age: 27
End: 2022-03-18
Payer: COMMERCIAL

## 2022-03-19 ENCOUNTER — HOSPITAL ENCOUNTER (OUTPATIENT)
Dept: SLEEP MEDICINE | Facility: OTHER | Age: 27
Discharge: HOME | End: 2022-03-19
Attending: PSYCHIATRY & NEUROLOGY
Payer: COMMERCIAL

## 2022-03-19 DIAGNOSIS — G47.33 OSA (OBSTRUCTIVE SLEEP APNEA): ICD-10-CM

## 2022-03-19 DIAGNOSIS — G47.30 SLEEP APNEA, UNSPECIFIED TYPE: ICD-10-CM

## 2022-03-19 PROCEDURE — 95810 POLYSOM 6/> YRS 4/> PARAM: CPT | Mod: 26,,, | Performed by: PSYCHIATRY & NEUROLOGY

## 2022-03-19 PROCEDURE — 95810 POLYSOM 6/> YRS 4/> PARAM: CPT

## 2022-03-19 PROCEDURE — 95810 PR POLYSOMNOGRAPHY, 4 OR MORE: ICD-10-PCS | Mod: 26,,, | Performed by: PSYCHIATRY & NEUROLOGY

## 2022-03-20 PROBLEM — G47.30 SLEEP APNEA: Status: ACTIVE | Noted: 2021-05-17

## 2022-03-20 NOTE — PROGRESS NOTES
Patient was educated about the purpose of the wires and what data was being collected.  Patient was informed that the technician may need to enter the room during the night to fix leads or make adjustments to the equipment.    Follow up instructions were provided to the patient

## 2022-03-23 ENCOUNTER — PATIENT MESSAGE (OUTPATIENT)
Dept: SLEEP MEDICINE | Facility: CLINIC | Age: 27
End: 2022-03-23
Payer: COMMERCIAL

## 2022-03-23 NOTE — PROCEDURES
Patient Name: [facility] #: [id number]   Sex: Male Study Date: 3/19/2022   : 1995 Clinic #: [id number]   Age: 26 Referring Physician: Dr Vivi Wilhelm   Height: 69.0 in Referring Physician #    Weight: 328.0 lbs Sleep Specialist: WILLIAN Wilhelm MD   B.M.I.: 48.4 Sleep Specialist # [id number]   Hypopnea rule: AASM1A Scoring Tech:    Total AHI: 21.0 Recording Tech: EUGENE Higgins/Rpsgt   Lowest O2 sat: 85.0% Recording Location: [facility]     Sleep architecture: This is a baseline polysomnogram. At lights out, the patient fell asleep in 34.4 minutes and slept for 62.2% of the time. Total sleep time (TST) was 260.5 minutes. 16.7% of TST was in Stage N1 sleep, 13.4% TST in slow wave sleep, and 14.2% TST in REM sleep. The REM latency was 106.5 minutes. Sleep architecture was mildly disrupted due to underlying sleep apnea.    Respiratory: Mild to moderate snoring was present. There was significant NONI (obstructive sleep apnea) based on AHI (apnea hypopnea index) criteria. The overall AHI was 21.0 with an oxygen geoff of 85.0%.  The supine AHI was 12.8 and the REM AHI was 47.0. The patient did not qualify for a split night study due to an insufficient number of events in the first half of the study.    Motor movement / Parasomnia: There were no significant limb movements of sleep noted. The total limb movement index was 0.0 (0.0with arousal).     Cardiac: Cardiac rhythm monitoring revealed a normal sinus rhythm  ..    Patient perception: On a post-sleep study questionnaire, the patient indicated that sleep was the same in the lab than compared to home.    IMPRESSION:  1. Moderate  NONI (327.23) based on AHI criteria      RECOMMENDATION:    1. CPAP titration study, if the patient is interested in this treatment modality.  2. In the interim, the patient should avoid supine position sleep, since sleep disordered breathing was most prevalent in this sleeping  position.

## 2022-03-24 ENCOUNTER — PATIENT MESSAGE (OUTPATIENT)
Dept: SLEEP MEDICINE | Facility: CLINIC | Age: 27
End: 2022-03-24

## 2022-03-24 ENCOUNTER — OFFICE VISIT (OUTPATIENT)
Dept: SLEEP MEDICINE | Facility: CLINIC | Age: 27
End: 2022-03-24
Payer: COMMERCIAL

## 2022-03-24 DIAGNOSIS — G47.33 OSA (OBSTRUCTIVE SLEEP APNEA): Primary | ICD-10-CM

## 2022-03-24 PROCEDURE — 1159F MED LIST DOCD IN RCRD: CPT | Mod: CPTII,95,, | Performed by: PSYCHIATRY & NEUROLOGY

## 2022-03-24 PROCEDURE — 1160F RVW MEDS BY RX/DR IN RCRD: CPT | Mod: CPTII,95,, | Performed by: PSYCHIATRY & NEUROLOGY

## 2022-03-24 PROCEDURE — 99213 PR OFFICE/OUTPT VISIT, EST, LEVL III, 20-29 MIN: ICD-10-PCS | Mod: 95,,, | Performed by: PSYCHIATRY & NEUROLOGY

## 2022-03-24 PROCEDURE — 1160F PR REVIEW ALL MEDS BY PRESCRIBER/CLIN PHARMACIST DOCUMENTED: ICD-10-PCS | Mod: CPTII,95,, | Performed by: PSYCHIATRY & NEUROLOGY

## 2022-03-24 PROCEDURE — 99213 OFFICE O/P EST LOW 20 MIN: CPT | Mod: 95,,, | Performed by: PSYCHIATRY & NEUROLOGY

## 2022-03-24 PROCEDURE — 1159F PR MEDICATION LIST DOCUMENTED IN MEDICAL RECORD: ICD-10-PCS | Mod: CPTII,95,, | Performed by: PSYCHIATRY & NEUROLOGY

## 2022-03-24 NOTE — PROGRESS NOTES
The patient location is: home  The chief complaint leading to consultation is: sleep disorder  Visit type: audiovisual  Total time spent with patient: 30 min  Each patient to whom he or she provides medical services by telemedicine is:  (1) informed of the relationship between the physician and patient and the respective role of any other health care provider with respect to management of the patient; and (2) notified that he or she may decline to receive medical services by telemedicine and may withdraw from such care at any time.        EPWORTH SLEEPINESS SCALE TOTAL SCORE  3/24/2022 2/22/2022   Total score 3 3       Anderson Mcclain is a 26 y.o. male seen today for CPAP follow up. Last seen on 2/23/2022  He comes to discuss sleep study results - he has re qualified for moderate NONI (the first time he was diagnosed several years ago he has not started treatment)     Still reports  occasional difficulty with falling and staying asleep.  Occasional episodes of isolated sleep paralysis were described int he past.   Vancleave Sleepiness Scale score during initial sleep evaluation was still  3.        Medications pertinent to sleep  disorders taken currently:Seroquel      Sleep studies  PSG study  In 2021 showed significant positional  NONI with the AHI of 16/hour and SaO2 minimum of 90 %. Sleep duration was a little over 2 hrs only.   The overall AHI was 21.0 with an oxygen geoff of 85.0%.  The supine AHI was 12.8 and the REM AHI was 47.0. The patient did not qualify for a split night study due to an insufficient number of events in the first half of the study.     3/22:The overall AHI was 21.0 with an oxygen geoff of 85.0%.  The supine AHI was 12.8 and the REM AHI was 47.0.       EPWORTH SLEEPINESS SCALE TOTAL SCORE  3/24/2022 2/22/2022   Total score 3 3         EPWORTH SLEEPINESS SCALE 2/22/2022   Sitting and reading 0   Watching TV 0   Sitting, inactive in a public place (e.g. a theatre or a meeting) 0   As a  "passenger in a car for an hour without a break 2   Lying down to rest in the afternoon when circumstances permit 1   Sitting and talking to someone 0   Sitting quietly after a lunch without alcohol 0   In a car, while stopped for a few minutes in traffic 0   Total score 3     PHYSICAL EXAM:    No Vital Signs were taken during this virtual visit.  There were no vitals taken for this visit.          ASSESSMENT:    1.NONI; moderate  Previous diagnosis of NONI -actual severity of NONI is hard to describe due to very poor sleep duration of his previous PSG.  The patient symptomatically has  excessive daytime sleepiness, snoring,  witnessed breathing pauses, excessive daytime fatigue, gasping for air in sleep, interrupted sleep and nocturia  with exam findings of "a crowded oral airway and elevated body mass index. The patient has medical co-morbidities of GERD and chronic headaches\,  which can be worsened by NONI. This warrants further investigation for  sleep apnea; needs to do the study with prescription sleep aid to insure better sleep duration; that would allow to determine NONI severity more accurately to be able to recommend OA (oral appliance) for NONI vs CPAP treatment route.   2. Isolated recurrent sleep paralysis     3. Insomnia - significantly improved on Seroquel (added as a part of depression management)            PLAN:  Sleep study were discussed during this tele-visit with graphs and chart demonstration, all the questions were answered.     Will order APAP 5-20; follow up in 31-90 days of starting APAP.   PSG in lab is reuired given concurrent history of sleep paralysis.    During our discussion today, we talked about the etiology of  NONI as well as the potential ramifications of untreated sleep apnea, which could include daytime sleepiness, hypertension, heart disease and/or stroke.  We discussed potential treatment options, which could include weight loss, body positioning, continuous positive airway " pressure (CPAP), or referral for surgical consideration. Meanwhile, he  is urged to avoid supine sleep, weight gain and alcoholic beverages since all of these can worsen NONI.     The patient was given open opportunity to ask questions and/or express concerns about treatment plan. Two point patient identifier confirmed.       Precautions: The patient was advised to abstain from driving should he feel sleepy or drowsy.    Follow up: MD after the sleep study has been completed.     31-minute visit. >50% spent counseling patient and coordination of care.  The patient was  cautioned against drowsy driving.

## 2022-03-24 NOTE — PATIENT INSTRUCTIONS
I'm sending your CPAP prescription to [facility]. Will indicate Resmed 10 or 11 on the prescription. Both 10 and 11 Resmed models are good.    You will hear back from them  once they process your request.    Your prescription will also go to [facility].    You can find the contact info below,.    Please schdule the follow up with me (insurance requirement) once you receive your machine - it should fall between 31 and 90 days from the receipt of the machine    _____      [facility] - [phone]; [phone]  [address] [place] [address] - [phone]; [phone]  [address] [place] [address]  [phone]      _____________      Your prescription will also go to [facility] department - you can reach them at  [phone]->ext1->option 2

## 2022-03-24 NOTE — Clinical Note
Orlando Colbert,   Please fax Anderson Mayfieldit CPAP order, this note, sleep study and demo to [facility].   Thank you,

## 2022-04-06 ENCOUNTER — PATIENT MESSAGE (OUTPATIENT)
Dept: SLEEP MEDICINE | Facility: CLINIC | Age: 27
End: 2022-04-06
Payer: COMMERCIAL

## 2024-03-28 ENCOUNTER — PATIENT MESSAGE (OUTPATIENT)
Dept: INTERNAL MEDICINE | Facility: CLINIC | Age: 29
End: 2024-03-28

## 2024-03-28 ENCOUNTER — PATIENT OUTREACH (OUTPATIENT)
Dept: ADMINISTRATIVE | Facility: HOSPITAL | Age: 29
End: 2024-03-28
Payer: MEDICAID

## 2024-03-28 ENCOUNTER — OFFICE VISIT (OUTPATIENT)
Dept: INTERNAL MEDICINE | Facility: CLINIC | Age: 29
End: 2024-03-28
Payer: MEDICAID

## 2024-03-28 VITALS
DIASTOLIC BLOOD PRESSURE: 84 MMHG | SYSTOLIC BLOOD PRESSURE: 130 MMHG | HEART RATE: 100 BPM | HEIGHT: 69 IN | OXYGEN SATURATION: 98 % | BODY MASS INDEX: 46.65 KG/M2 | WEIGHT: 315 LBS

## 2024-03-28 DIAGNOSIS — Z23 NEED FOR VACCINATION: ICD-10-CM

## 2024-03-28 DIAGNOSIS — F41.9 ANXIETY: ICD-10-CM

## 2024-03-28 DIAGNOSIS — E66.01 SEVERE OBESITY (BMI >= 40): Chronic | ICD-10-CM

## 2024-03-28 DIAGNOSIS — Z00.01 ENCOUNTER FOR ANNUAL GENERAL MEDICAL EXAMINATION WITH ABNORMAL FINDINGS IN ADULT: Primary | ICD-10-CM

## 2024-03-28 DIAGNOSIS — Z76.89 ENCOUNTER TO ESTABLISH CARE WITH NEW DOCTOR: ICD-10-CM

## 2024-03-28 DIAGNOSIS — K76.0 HEPATIC STEATOSIS: ICD-10-CM

## 2024-03-28 PROBLEM — F60.89 CLUSTER B PERSONALITY DISORDER: Status: RESOLVED | Noted: 2023-02-07 | Resolved: 2024-03-28

## 2024-03-28 PROBLEM — M54.2 NECK PAIN: Status: RESOLVED | Noted: 2018-05-02 | Resolved: 2024-03-28

## 2024-03-28 PROBLEM — R20.0 NUMBNESS AND TINGLING: Status: RESOLVED | Noted: 2021-12-21 | Resolved: 2024-03-28

## 2024-03-28 PROBLEM — R19.7 DIARRHEA: Status: RESOLVED | Noted: 2017-08-14 | Resolved: 2024-03-28

## 2024-03-28 PROBLEM — A04.72 C. DIFFICILE COLITIS: Status: RESOLVED | Noted: 2021-06-28 | Resolved: 2024-03-28

## 2024-03-28 PROBLEM — N53.12 PAINFUL EJACULATION: Status: RESOLVED | Noted: 2021-07-29 | Resolved: 2024-03-28

## 2024-03-28 PROBLEM — J30.81 ALLERGIC RHINITIS DUE TO ANIMAL HAIR AND DANDER: Status: ACTIVE | Noted: 2021-04-13

## 2024-03-28 PROBLEM — M54.6 LEFT-SIDED THORACIC BACK PAIN: Status: RESOLVED | Noted: 2018-05-02 | Resolved: 2024-03-28

## 2024-03-28 PROBLEM — N50.812 PAIN IN BOTH TESTICLES: Status: RESOLVED | Noted: 2021-07-29 | Resolved: 2024-03-28

## 2024-03-28 PROBLEM — E66.813 CLASS 3 SEVERE OBESITY DUE TO EXCESS CALORIES WITH SERIOUS COMORBIDITY AND BODY MASS INDEX (BMI) OF 45.0 TO 49.9 IN ADULT: Status: RESOLVED | Noted: 2021-05-17 | Resolved: 2024-03-28

## 2024-03-28 PROBLEM — R30.0 DYSURIA: Status: RESOLVED | Noted: 2021-07-29 | Resolved: 2024-03-28

## 2024-03-28 PROBLEM — G56.22 CUBITAL TUNNEL SYNDROME ON LEFT: Status: RESOLVED | Noted: 2021-01-12 | Resolved: 2024-03-28

## 2024-03-28 PROBLEM — R51.9 HEADACHE: Status: RESOLVED | Noted: 2018-05-02 | Resolved: 2024-03-28

## 2024-03-28 PROBLEM — N50.811 PAIN IN BOTH TESTICLES: Status: RESOLVED | Noted: 2021-07-29 | Resolved: 2024-03-28

## 2024-03-28 PROBLEM — R07.9 CHEST PAIN: Status: RESOLVED | Noted: 2021-05-28 | Resolved: 2024-03-28

## 2024-03-28 PROBLEM — R07.89 OTHER CHEST PAIN: Status: RESOLVED | Noted: 2021-06-29 | Resolved: 2024-03-28

## 2024-03-28 PROBLEM — F12.959 CANNABIS-INDUCED PSYCHOTIC DISORDER: Chronic | Status: RESOLVED | Noted: 2021-02-22 | Resolved: 2024-03-28

## 2024-03-28 PROBLEM — F41.1 GENERALIZED ANXIETY DISORDER: Chronic | Status: RESOLVED | Noted: 2021-03-22 | Resolved: 2024-03-28

## 2024-03-28 PROBLEM — R94.39 ABNORMAL STRESS ECG WITH TREADMILL: Status: RESOLVED | Noted: 2021-06-29 | Resolved: 2024-03-28

## 2024-03-28 PROBLEM — R06.02 SOB (SHORTNESS OF BREATH): Status: RESOLVED | Noted: 2021-05-28 | Resolved: 2024-03-28

## 2024-03-28 PROBLEM — R20.2 NUMBNESS AND TINGLING: Status: RESOLVED | Noted: 2021-12-21 | Resolved: 2024-03-28

## 2024-03-28 PROBLEM — L30.9 ECZEMA: Status: RESOLVED | Noted: 2021-04-13 | Resolved: 2024-03-28

## 2024-03-28 PROBLEM — R25.3 EYELID TWITCH: Status: RESOLVED | Noted: 2018-05-02 | Resolved: 2024-03-28

## 2024-03-28 PROBLEM — D17.22 LIPOMA OF LEFT UPPER EXTREMITY: Status: RESOLVED | Noted: 2021-01-12 | Resolved: 2024-03-28

## 2024-03-28 PROBLEM — F12.20 CANNABIS DEPENDENCE: Chronic | Status: RESOLVED | Noted: 2023-02-06 | Resolved: 2024-03-28

## 2024-03-28 PROCEDURE — 3079F DIAST BP 80-89 MM HG: CPT | Mod: CPTII,,, | Performed by: FAMILY MEDICINE

## 2024-03-28 PROCEDURE — 99385 PREV VISIT NEW AGE 18-39: CPT | Mod: S$PBB,,, | Performed by: FAMILY MEDICINE

## 2024-03-28 PROCEDURE — 1159F MED LIST DOCD IN RCRD: CPT | Mod: CPTII,,, | Performed by: FAMILY MEDICINE

## 2024-03-28 PROCEDURE — 99214 OFFICE O/P EST MOD 30 MIN: CPT | Mod: PBBFAC,25 | Performed by: FAMILY MEDICINE

## 2024-03-28 PROCEDURE — 3075F SYST BP GE 130 - 139MM HG: CPT | Mod: CPTII,,, | Performed by: FAMILY MEDICINE

## 2024-03-28 PROCEDURE — 90471 IMMUNIZATION ADMIN: CPT | Mod: PBBFAC

## 2024-03-28 PROCEDURE — 1160F RVW MEDS BY RX/DR IN RCRD: CPT | Mod: CPTII,,, | Performed by: FAMILY MEDICINE

## 2024-03-28 PROCEDURE — 90715 TDAP VACCINE 7 YRS/> IM: CPT | Mod: PBBFAC

## 2024-03-28 PROCEDURE — 99999PBSHW TDAP VACCINE GREATER THAN OR EQUAL TO 7YO IM: Mod: PBBFAC,,,

## 2024-03-28 PROCEDURE — 3008F BODY MASS INDEX DOCD: CPT | Mod: CPTII,,, | Performed by: FAMILY MEDICINE

## 2024-03-28 PROCEDURE — 99999 PR PBB SHADOW E&M-EST. PATIENT-LVL IV: CPT | Mod: PBBFAC,,, | Performed by: FAMILY MEDICINE

## 2024-03-28 RX ORDER — DIVALPROEX SODIUM 500 MG/1
1000 TABLET, FILM COATED, EXTENDED RELEASE ORAL DAILY
Qty: 180 TABLET | Refills: 0 | Status: SHIPPED | OUTPATIENT
Start: 2024-03-28 | End: 2024-06-19 | Stop reason: SDUPTHER

## 2024-03-28 RX ORDER — DIVALPROEX SODIUM 500 MG/1
1000 TABLET, FILM COATED, EXTENDED RELEASE ORAL DAILY
COMMUNITY
End: 2024-03-28 | Stop reason: SDUPTHER

## 2024-03-28 NOTE — PROGRESS NOTES
Subjective:     Patient ID: Anderson Mcclain is a 28 y.o. male.   Chief Complaint: Annual Exam and Establish Care    HPI:  Patient presents to establish care.  Patient is due for annual exam and labs.    Needs ref to psych for medication mgmt. Currently taking depakote and needs refill for bridge.    Asking for ref to nutrition to assist with weight loss. Has considered bariatric medicine but does not think he's ready for this yet.    Has known hx fatty liver disease.       Review of Problems & History:  Patient Active Problem List   Diagnosis    Hepatic steatosis    Anxiety    NONI (obstructive sleep apnea)    Sleep paralysis    GERD (gastroesophageal reflux disease)    Insomnia    Allergic rhinitis due to animal hair and dander    Severe obesity (BMI >= 40)      Past Medical History:   Diagnosis Date    Abnormal stress ECG with treadmill 06/29/2021    Asthma     C. 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   Rule Out    Cluster B personality disorder 02/07/2023    Cubital tunnel syndrome on left 01/12/2021    Eczema 04/13/2021    Generalized anxiety disorder 03/22/2021    History of infection of intestine due to Clostridioides difficile 06/28/2016    Hyperlipidemia       Past Surgical History:   Procedure Laterality Date    carpal and cubital tunnel release      COLONOSCOPY N/A 8/14/2017    Procedure: COLONOSCOPY;  Surgeon: Tate Sexton MD;  Location: [address]);  Service: Endoscopy;  Laterality: N/A;    ENDOSCOPIC ULTRASOUND OF UPPER GASTROINTESTINAL TRACT N/A 9/20/2021    Procedure: ULTRASOUND, UPPER GI TRACT, ENDOSCOPIC;  Surgeon: Yolanda Dowd MD;  Location: [facility];  Service: Endoscopy;  Laterality: N/A;  EGD and linear EUS with 19 gauge sharkcore, formalin.     ULNAR NERVE TRANSPOSITION      WISDOM TOOTH EXTRACTION        Social History  "    Socioeconomic History    Marital status: Single   Tobacco Use    Smoking status: Former    Smokeless tobacco: Never   Substance and Sexual Activity    Alcohol use: Not Currently     Alcohol/week: 2.0 standard drinks of alcohol     Types: 2 Cans of beer per week    Drug use: No    Sexual activity: Not Currently        Medication Review:    Current Outpatient Medications:     divalproex ER (DEPAKOTE ER) 500 MG Tb24, Take 2 tablets (1,000 mg total) by mouth once daily., Disp: 180 tablet, Rfl: 0     Review of Systems   Constitutional:  Negative for chills, fatigue and fever.   HENT:  Negative for nasal congestion, ear pain, postnasal drip and sore throat.    Eyes:  Negative for visual disturbance.   Respiratory:  Negative for cough, shortness of breath and wheezing.    Cardiovascular:  Negative for chest pain and palpitations.   Gastrointestinal:  Negative for abdominal pain, change in bowel habit, constipation, diarrhea, nausea and vomiting.   Genitourinary:  Negative for dysuria and hematuria.   Musculoskeletal:  Negative for back pain, leg pain and neck pain.   Neurological:  Negative for dizziness, numbness and headaches.   Hematological:  Negative for adenopathy.   Psychiatric/Behavioral:  Negative for dysphoric mood, sleep disturbance and suicidal ideas. The patient is not nervous/anxious.           Objective:      Vitals:    03/28/24 0958   BP: 130/84   BP Location: Left arm   Patient Position: Sitting   BP Method: Medium (Manual)   Pulse: 100   SpO2: 98%   Weight: (!) 161.5 kg (356 lb 0.7 oz)   Height: 5' 9" (1.753 m)      Physical Exam  Vitals reviewed.   Constitutional:       General: He is not in acute distress.     Appearance: Normal appearance. He is obese. He is not ill-appearing.   HENT:      Head: Normocephalic and atraumatic.      Right Ear: Tympanic membrane, ear canal and external ear normal. There is no impacted cerumen.      Left Ear: Tympanic membrane, ear canal and external ear normal. There " is no impacted cerumen.      Nose: Nose normal. No congestion or rhinorrhea.      Mouth/Throat:      Mouth: Mucous membranes are moist.      Pharynx: Oropharynx is clear. No oropharyngeal exudate or posterior oropharyngeal erythema.   Eyes:      General: No scleral icterus.        Right eye: No discharge.         Left eye: No discharge.      Conjunctiva/sclera: Conjunctivae normal.   Neck:      Thyroid: No thyroid mass, thyromegaly or thyroid tenderness.   Cardiovascular:      Rate and Rhythm: Normal rate and regular rhythm.      Pulses: Normal pulses.      Heart sounds: Normal heart sounds. No murmur heard.     No friction rub. No gallop.   Pulmonary:      Effort: Pulmonary effort is normal. No respiratory distress.      Breath sounds: Normal breath sounds. No wheezing, rhonchi or rales.   Abdominal:      General: Abdomen is flat. Bowel sounds are normal. There is no distension.      Palpations: Abdomen is soft. There is no mass.      Tenderness: There is no abdominal tenderness. There is no guarding.   Musculoskeletal:         General: No swelling or deformity. Normal range of motion.      Cervical back: Normal range of motion and neck supple. No tenderness.   Lymphadenopathy:      Cervical: No cervical adenopathy.   Skin:     General: Skin is warm and dry.   Neurological:      General: No focal deficit present.      Mental Status: He is alert and oriented to person, place, and time. Mental status is at baseline.      Gait: Gait normal.      Deep Tendon Reflexes: Reflexes normal.   Psychiatric:         Mood and Affect: Mood normal.         Behavior: Behavior normal.         Thought Content: Thought content normal.         Judgment: Judgment normal.           Assessment:       Problem List Items Addressed This Visit          Psychiatric    Anxiety    Relevant Medications    divalproex ER (DEPAKOTE ER) 500 MG Tb24    Other Relevant Orders    Ambulatory referral/consult to Psychiatry       Endocrine    Severe obesity  (BMI >= 40) (Chronic)    Relevant Orders    Ambulatory Referral/Consult to Lifestyle Nutrition       GI    Hepatic steatosis     Other Visit Diagnoses       Encounter for annual general medical examination with abnormal findings in adult    -  Primary    Relevant Orders    Comprehensive Metabolic Panel    CBC Auto Differential    Lipid Panel    Hemoglobin A1C    TSH    T4, Free    Hepatitis C Antibody    HIV 1/2 Ag/Ab (4th Gen)    Encounter to establish care with new doctor        Need for vaccination        Relevant Orders    Tdap Vaccine              Plan:       1. Encounter for annual general medical examination with abnormal findings in adult  Health maintenance updated  Chronic issues reviewed  Fasting labs ordered  - Comprehensive Metabolic Panel; Future  - CBC Auto Differential; Future  - Lipid Panel; Future  - Hemoglobin A1C; Future  - TSH; Future  - T4, Free; Future  - Hepatitis C Antibody; Future  - HIV 1/2 Ag/Ab (4th Gen); Future    2. Encounter to establish care with new doctor  Reviewed chronic medical conditions, updated problem list and medication list    3. Need for vaccination  Tetanus administered in clinic  Influenza declined  COVID declined  - Tdap Vaccine    4. Severe obesity (BMI >= 40)  - Ambulatory Referral/Consult to Lifestyle Nutrition; Future    5. Anxiety  - Ambulatory referral/consult to Psychiatry; Future  - divalproex ER (DEPAKOTE ER) 500 MG Tb24; Take 2 tablets (1,000 mg total) by mouth once daily.  Dispense: 180 tablet; Refill: 0    6. Hepatic steatosis  F/u LFTs          Follow up in about 1 year (around 3/28/2025) for Annual Visit, Fasting labs.     Flynn Stevenson MD, FAAFP  Family Medicine Physician  [facility]  03/28/2024

## 2024-03-28 NOTE — PROGRESS NOTES
Health Maintenance Due   Topic Date Due    Hepatitis C Screening  Never done    Pneumococcal Vaccines (Age 0-64) (1 of 2 - PCV) Never done    HPV Vaccines (1 - Male 2-dose series) Never done    HIV Screening  Never done       Chart reviewed and updated. Outside progress note uploaded.     Suzie Arango LPN   Clinical Care Coordinator  [facility]ness

## 2024-04-01 ENCOUNTER — LAB VISIT (OUTPATIENT)
Dept: LAB | Facility: HOSPITAL | Age: 29
End: 2024-04-01
Attending: FAMILY MEDICINE
Payer: MEDICAID

## 2024-04-01 ENCOUNTER — PATIENT MESSAGE (OUTPATIENT)
Dept: INTERNAL MEDICINE | Facility: CLINIC | Age: 29
End: 2024-04-01
Payer: MEDICAID

## 2024-04-01 DIAGNOSIS — Z00.01 ENCOUNTER FOR ANNUAL GENERAL MEDICAL EXAMINATION WITH ABNORMAL FINDINGS IN ADULT: ICD-10-CM

## 2024-04-01 LAB
ALBUMIN SERPL BCP-MCNC: 3.7 G/DL (ref 3.5–5.2)
ALP SERPL-CCNC: 51 U/L (ref 55–135)
ALT SERPL W/O P-5'-P-CCNC: 45 U/L (ref 10–44)
ANION GAP SERPL CALC-SCNC: 11 MMOL/L (ref 8–16)
AST SERPL-CCNC: 25 U/L (ref 10–40)
BASOPHILS # BLD AUTO: 0.08 K/UL (ref 0–0.2)
BASOPHILS NFR BLD: 0.8 % (ref 0–1.9)
BILIRUB SERPL-MCNC: 0.5 MG/DL (ref 0.1–1)
BUN SERPL-MCNC: 12 MG/DL (ref 6–20)
CALCIUM SERPL-MCNC: 9.3 MG/DL (ref 8.7–10.5)
CHLORIDE SERPL-SCNC: 103 MMOL/L (ref 95–110)
CHOLEST SERPL-MCNC: 189 MG/DL (ref 120–199)
CHOLEST/HDLC SERPL: 5.7 {RATIO} (ref 2–5)
CO2 SERPL-SCNC: 25 MMOL/L (ref 23–29)
CREAT SERPL-MCNC: 0.8 MG/DL (ref 0.5–1.4)
DIFFERENTIAL METHOD BLD: ABNORMAL
EOSINOPHIL # BLD AUTO: 0.3 K/UL (ref 0–0.5)
EOSINOPHIL NFR BLD: 2.7 % (ref 0–8)
ERYTHROCYTE [DISTWIDTH] IN BLOOD BY AUTOMATED COUNT: 13 % (ref 11.5–14.5)
EST. GFR  (NO RACE VARIABLE): >60 ML/MIN/1.73 M^2
ESTIMATED AVG GLUCOSE: 114 MG/DL (ref 68–131)
GLUCOSE SERPL-MCNC: 88 MG/DL (ref 70–110)
HBA1C MFR BLD: 5.6 % (ref 4–5.6)
HCT VFR BLD AUTO: 42.4 % (ref 40–54)
HCV AB SERPL QL IA: NORMAL
HDLC SERPL-MCNC: 33 MG/DL (ref 40–75)
HDLC SERPL: 17.5 % (ref 20–50)
HGB BLD-MCNC: 13.7 G/DL (ref 14–18)
HIV 1+2 AB+HIV1 P24 AG SERPL QL IA: NORMAL
IMM GRANULOCYTES # BLD AUTO: 0.04 K/UL (ref 0–0.04)
IMM GRANULOCYTES NFR BLD AUTO: 0.4 % (ref 0–0.5)
LDLC SERPL CALC-MCNC: 110.6 MG/DL (ref 63–159)
LYMPHOCYTES # BLD AUTO: 3.7 K/UL (ref 1–4.8)
LYMPHOCYTES NFR BLD: 35.4 % (ref 18–48)
MCH RBC QN AUTO: 29.6 PG (ref 27–31)
MCHC RBC AUTO-ENTMCNC: 32.3 G/DL (ref 32–36)
MCV RBC AUTO: 92 FL (ref 82–98)
MONOCYTES # BLD AUTO: 0.7 K/UL (ref 0.3–1)
MONOCYTES NFR BLD: 6.5 % (ref 4–15)
NEUTROPHILS # BLD AUTO: 5.6 K/UL (ref 1.8–7.7)
NEUTROPHILS NFR BLD: 54.2 % (ref 38–73)
NONHDLC SERPL-MCNC: 156 MG/DL
NRBC BLD-RTO: 0 /100 WBC
PLATELET # BLD AUTO: 287 K/UL (ref 150–450)
PMV BLD AUTO: 10.9 FL (ref 9.2–12.9)
POTASSIUM SERPL-SCNC: 4.6 MMOL/L (ref 3.5–5.1)
PROT SERPL-MCNC: 6.8 G/DL (ref 6–8.4)
RBC # BLD AUTO: 4.63 M/UL (ref 4.6–6.2)
SODIUM SERPL-SCNC: 139 MMOL/L (ref 136–145)
T4 FREE SERPL-MCNC: 0.92 NG/DL (ref 0.71–1.51)
TRIGL SERPL-MCNC: 227 MG/DL (ref 30–150)
TSH SERPL DL<=0.005 MIU/L-ACNC: 2.71 UIU/ML (ref 0.4–4)
WBC # BLD AUTO: 10.4 K/UL (ref 3.9–12.7)

## 2024-04-01 PROCEDURE — 83036 HEMOGLOBIN GLYCOSYLATED A1C: CPT | Performed by: FAMILY MEDICINE

## 2024-04-01 PROCEDURE — 80053 COMPREHEN METABOLIC PANEL: CPT | Performed by: FAMILY MEDICINE

## 2024-04-01 PROCEDURE — 85025 COMPLETE CBC W/AUTO DIFF WBC: CPT | Performed by: FAMILY MEDICINE

## 2024-04-01 PROCEDURE — 87389 HIV-1 AG W/HIV-1&-2 AB AG IA: CPT | Performed by: FAMILY MEDICINE

## 2024-04-01 PROCEDURE — 80061 LIPID PANEL: CPT | Performed by: FAMILY MEDICINE

## 2024-04-01 PROCEDURE — 84443 ASSAY THYROID STIM HORMONE: CPT | Performed by: FAMILY MEDICINE

## 2024-04-01 PROCEDURE — 36415 COLL VENOUS BLD VENIPUNCTURE: CPT | Performed by: FAMILY MEDICINE

## 2024-04-01 PROCEDURE — 84439 ASSAY OF FREE THYROXINE: CPT | Performed by: FAMILY MEDICINE

## 2024-04-01 PROCEDURE — 86803 HEPATITIS C AB TEST: CPT | Performed by: FAMILY MEDICINE

## 2024-04-02 ENCOUNTER — PATIENT MESSAGE (OUTPATIENT)
Dept: INTERNAL MEDICINE | Facility: CLINIC | Age: 29
End: 2024-04-02
Payer: MEDICAID

## 2024-05-30 ENCOUNTER — PATIENT MESSAGE (OUTPATIENT)
Dept: INTERNAL MEDICINE | Facility: CLINIC | Age: 29
End: 2024-05-30
Payer: MEDICAID

## 2024-06-04 ENCOUNTER — NUTRITION (OUTPATIENT)
Dept: NUTRITION | Facility: CLINIC | Age: 29
End: 2024-06-04
Payer: MEDICAID

## 2024-06-04 VITALS — BODY MASS INDEX: 46.65 KG/M2 | HEIGHT: 69 IN | WEIGHT: 315 LBS

## 2024-06-04 DIAGNOSIS — E66.01 SEVERE OBESITY (BMI >= 40): Primary | Chronic | ICD-10-CM

## 2024-06-04 PROCEDURE — 99212 OFFICE O/P EST SF 10 MIN: CPT | Mod: PBBFAC,PN

## 2024-06-04 PROCEDURE — 99999 PR PBB SHADOW E&M-EST. PATIENT-LVL II: CPT | Mod: PBBFAC,,,

## 2024-06-04 PROCEDURE — 99999PBSHW PR PBB SHADOW TECHNICAL ONLY FILED TO HB: Mod: PBBFAC,,,

## 2024-06-04 PROCEDURE — 97802 MEDICAL NUTRITION INDIV IN: CPT | Mod: PBBFAC,PN

## 2024-06-04 NOTE — PATIENT INSTRUCTIONS
Name: Anderson Mcclain  Date: 6/4/2024    Nutrition protocol    Daily Energy Requirements:  Calories: 2600  Protein: 195-225 grams  Carbohydrates: 210-230 grams  Fats: 85-95 grams (Choose plant-based heart healthy fats)  Total fluid: 85 fl ounces + sweat loss      Breakfast (within 2 hours of waking up)  3-4 servings of carbohydrates (45-60 grams) + at least 35 grams lean protein/heart-healthy fat  Breakfast option 1: Overnight Oats   ½ cup Old Fashioned Oats, uncooked (yields about 1.5 cup, cooked)  1 scoop of protein powder (brand that has at least 20 g per scoop)  6 oz. unsweetened milk (or enough to cover oats and absorb the liquid)  1 Tbsp nut butter   1 serving of fruit, chopped  Breakfast option 2: Homemade Breakfast [place]   2 slices 100% whole wheat bread  2 eggs  1 oz. cheese (1/2 cup shredded)  2 slices center-cut flores -OR- 1 chicken sausage wagner/link   1 serving of fruit on the side  Breakfast option 3: Hard Boiled Eggs and Greek Yogurt Parfait  1 hard-boiled egg  ¾ cup nonfat Greek yogurt (see brand recommendations)  ½ cup whole grain cereal to add to yogurt (see brand recommendations)  1 serving of fruit to add to yogurt  1 tbsp. alla/flax seeds to add to yogurt  Breakfast option 4: Protein Smoothie  1 scoop protein powder (see brands listed at end of document)  1 tablespoons nut butter   8-10 ounces unsweetened almond milk   2 servings of fruit (i.e. 2 cups frozen berries)  Breakfast option 5: Frozen Breakfast [place]  See brand recommendations at end of document  Breakfast option 6:   Refer to Fueling Well On-The-Go Guide for options at different fast food/fast casual places          Lunch   6-7 ounces lean protein (42-49 grams) + unlimited non-starchy veggies + 3-4 servings of carbohydrates (45-60 grams) + heart-healthy fat   Protein: 7 oz portion: choose from lean protein page of meal planning guidebook   Carbohydrates: Examples of 'up to' 4 servings of carbohydrates = ~60-70 grams:  2-2 ½  cups cooked lentil pasta -or- 2 cups cooked whole wheat pasta; 1 1/3 cup cooked brown rice or chickpea rice; 1 medium potato or sweet potato (5-6 oz in weight + 1 cup mashed potatoes; 2 slices 100-calorie bread + banana + Flavored Greek yogurt; 1 cup cooked beans + 2/3 cup cooked rice; etc  Lunch option 1:   6-7 oz. lean protein of choice  At least 1 cup non-starchy vegetables cooked in 1-2 tablespoons olive oil   3-4 servings of carbohydrates (examples above)  Lunch option 2: [place] with fruit  [place]   5-6 ounces of lean meat (chosen from meal planning guide--ex. Grilled chicken, turkey breast luncheon meat, chicken/tuna salad)   1 oz. cheese (1 slice or ¼ cup shredded)  2 slices of 100% whole wheat bread (Joe's is good)  Dressed with lettuce, sliced tomato, pickles, 1 Tbsp Cruz + Mustard  1 piece of fruit/1 cup sliced fruit  Lunch option 3: Salad  5-6 ounces of lean protein (chicken, salmon, tilapia, turkey, etc)  2 cups dark leafy green mixture (spring mix, kale, arugula, mixed with albina lettuce)  ½ cup of starch/grain (cubed sweet potatoes, brown rice, etc)  ½ cup cooked beans (chickpea, black beans, kidney beans etc)  1 cup berries or fruit, unsweetened dried (2 Tbsp) or fresh  Unlimited non-starchy vegetables  1 ounce dressing (lightly coat the lettuce) Marcus's is a good brand      Snack   2 servings of carbohydrates= 30 grams + at least 30 g lean protein/heart-healthy fat  Ready-to-drink protein shake (see brand recommendations) + 1 banana w/ 1 tbsp. peanut butter  1, 5 oz. container nonfat Greek yogurt (see brand recommendations) + 1 tbsp. peanut butter -OR- ¼ cup nuts mixed in + 1 serving of fruit   2 hard-boiled eggs + 1 cheese stick + 1-2 servings of whole grain crackers   Protein Bar (see brands below) + 1 large banana or apple w/ 1 tbsp. peanut butter  2-3 ounces of beef/turkey jerky + 1 serving of fruit  Ready-to-drink protein shake (see brands at end of document) + ¼ cup nuts + 1 serving of  fruit  [place] made with 3-4 oz. lean protein, 2 slices whole grain bread, 1 tbsp. little + mustard   PB&J made with 2 slices 100% whole wheat bread + 2 tbsp. peanut butter + 1 tbsp. No Sugar Added Jelly (Polaner's is a good brand)  Flapjacked protein mighty muffin (typically found on baking aisle of grocery stores)  [place] Balanced Break         Dinner   7 oz lean protein (49 grams) + 2 servings of carbohydrates (30-40 grams) + 1 cup or more non-starchy veggies + heart-healthy fats  Any lunch option works, just decrease the amount of carbs eaten  If eating out, visualize your portions, keeping in mind restaurant portions are often much more than needed.   No matter the dish, focus on the lean protein and then have 2 servings of high-quality carbohydrates (ex. Jambalaya - load up the lean turkey sausage and skinless chicken, have about 2/3 cup of rice)  Carb swaps if desired:  Hearts of palm-based 'linguini'  'Riced' Cauliflower  'Riced' Broccoli  Cauliflower mash   Spaghetti squash  Zoodles  Spiralized Beets   Low Carb Breads: (freezer aisle)  'Base Culture' 7 Nut + Seed and Original Paleo Bread  [place]: Seeded low carb keto bread  Unbuns: Low carb hamburger bun   Outer Aisle Plantpower à makes pre-made frozen cauliflower pizza crust & wraps as well as frozen sandwich thins  Great for making low carb pizzas, burgers and sandwiches  Mike'flour frozen flatbreads and pizza crustsà any flavor   Great for making low carb pizzas, burgers, and sandwiches    Think outside the box for low carb dishes:  Kabobs, stir mayer with riced cauliflower or riced broccoli, stuffed bell peppers with no rice, lettuce wraps, eggplant lasagna, shrimp etouffee made with riced cauliflower, request a sushi roll that contains raw fish to be made without rice, etc  Quest pizza à low carb + high protein frozen pizza. Add additional side of non-starchy veggies (Note: This frozen entrée is high in sodium)  Dinner option 1: Spaghetti and  Meat Sauce  ¾ cup cooked lentil/chickpea/100% whole wheat pasta  Unlimited zoodles, spaghetti squash, etc.  5-6 oz. lean ground beef  Lower-sugar marinara sauce (see brands below)  Unlimited non-starchy vegetables (could finely chop mushrooms, onions, and peppers, and add to meat sauce)  Dinner option 2: Tacos  2 Carb Balance tortillas (large taco kind is fine)  6 oz. lean ground beef, ground turkey, fish, shrimp, chicken, etc.  1 oz. cheese (¼ cup shredded)  Unlimited lettuce, onions, tomatoes to top  Salsa to taste  Optional: ¼ avocado or ¼ cup guacamole  Dinner option 3:  Any lunch option can be eaten for dinner, just decrease the amount of carbs          Optional post-dinner snack or sweet: Anything up to 250 calories  'Fun size' candy  Carb Smart ice cream bar  SF pudding      Restaurant Tips    Pick 1 out of the 4 Rule: Instead of eating bread/tortilla chips, an appetizer, alcoholic drink and dessert, choose just one to have with your entrée   Focus on lean proteins: Refer to lean meat/meat substitutes page in meal planning guidebook. Select items grilled, baked, broiled, braised, poached or roasted       For your heart health, avoid crispy, crunchy, breaded, paneed or stuffed items and items that are cream based, au gratin or buttered       Order sauces, dressings, and gravies on the side. This way you can add 1-2 tablespoons yourself. This helps with portion control      Request extra non-starchy vegetables instead of a starchy side dish. If the starchy side is something you love, consider splitting it with someone else at the table      Beverages: Order water with lemon, sparkling water, or unsweetened tea. Avoid sugary soft drinks, juices and mixers   Deep fried foods: Enjoy no more than 2x/month         Dining Out      [employer]sUsersnap Fit   Designed to take the guesswork out of dining out healthfully, [employer]sner Metabiota Fit makes the healthy choice the easy choice   Visit [email or web address].[employer]    Order  the Eat Fit Cookbook to create restaurant quality dishes at home   Download the [facility] Eat Fit marlen for free on your smartphone   All Eat Fit restaurants & dishes by location    Nutrition facts for every Eat Fit dish   200 + Eat Fit approved recipes   Grocery shopping guides + community wellness resources         Building A Better Smoothie   Choose your protein. Pick 1 from the followin oz 2% Plain Greek yogurt   5 oz 2% Cottage cheese   Plant-based protein powder   [facility] [place]   [place]   Garden of Life RAW   100% whey protein powder   2-3 scoops Collagen Peptides (Vital Proteins is a brand favorite)   Make it sweet:   Add ~1-1.5 cups fresh -or- No Sugar Added frozen fruit    Add your veggies:   Instead of ice, choose frozen cauliflower florets    Cauliflower helps with the detoxification process in our bodies, and it's virtually tasteless!   Greens: spinach, kale, or other leafy greens   Beets are a great addition to smoothies, especially paired with strawberries and lemon   Cucumbers and celery are refreshing ways to enhance nutrition and hydration within your smoothie   Add one of the following plant-based fats:    Nut butter: 1 teaspoon - 1 tablespoon   Natural peanut butter   [place] butter   Cashew butter   Nuts: ~2-3 tablespoons    Avocado (¼ - ½ [place])   Avocado oil  Extra Virgin Olive Oil: 1 teaspoon - 1 tablespoon   Add a liquid to reach your desired consistency:   Unsweetened/No Sugar Added plant-based milk alternative    [place], Hemp, Cashew, Coconut, Rice or Soy    Milk: 1% or 2%   Healthy add-ins for an extra nutritional boost:   1 tablespoon flaxseeds -or- alla seeds          Ordering A Better-For-You Salad   Include a lean protein, any amount of non-starchy vegetables, and small amount of plant-based fats   Order dressings on the side to better control portion sizes   Add ~2-3 tablespoons yourself to lightly coat   Pick 2-3 salad add-ins, each being ~2 tablespoons:   Dressing   Cheese    Nuts   Avocado/Guacamole         Additional Nutrition Tips      -Exercise  Movement: Aim for at least 45-60 minutes of moderate exercise 5 days a week.    If you're getting back in the routine of exercising, then start off with small goals   Even a 15-minute daily walk adds up    Be consistent and increase the length of time you exercise gradually until you're able to do at least 45 minutes most days of the week   Be sure to consume at least 20 grams protein within 1-hour post weight bearing exercise/high intensity workouts      -Grocery Aisle Food Brand Guidelines: Won #7 rule   Look for products that have 7 grams or less added sugar, and at least 7 grams or more protein      -Frozen Meal Guidelines:    ~45 grams or less carbohydrates & at least 20 grams protein   If you find a brand you enjoy that doesn't provide 20 grams protein, you can add leftover lean protein to the frozen meal   See notes below for several brand examples   Refer to the Eat Fit Shopping Guide for additional brand specific recommendations      -Portion Control:   Measure and/or weigh your food in cooked portions for the first time you start your plan   See what each portion looks like on your plate and then eyeball each portion for future meals and snacks      -Avoid/Limit the following as these may be inflammatory to our body and can decrease Energy throughout the day if consumed often:   Added Sugar   White, refined, processed carbohydrates   Alcohol   Fried Foods   -If you're having trouble finding a specific food brand, try looking on the brands website. Most companies have a 'store   find a store' on their website         Favorite Food Brands      - Whole Grain Breads:   Joe's Killer Bread - 21 Whole Grains and Seeds (green label), Good Seed (yellow label), Power Seed (red label)    Thin sliced -or- regular slice   Any 100% whole wheat/whole grain bread   'Base Culture' 7 Nut + Seed and Original Paleo Bread (GF and found  in freezer section)   Great to use for dinner since low carb      -Whole Grain Tortillas  Wraps:   Corn    Siete: Grain free tortillas: coconut flour  almond flour   [place] - Whole wheat tortillas + whole wheat carb balance      -High Protein Pastas:   'Banza' chickpea pasta's:    Mac-n-cheese   Pasta's - all varieties    Red lentil  Yellow lentil pasta   Black bean pasta (aka squid ink pasta)   Edamame-based pasta      -Rice:   Brown rice (available in 10-minute boil in a bag)   Banza chickpea rice   RightRice -made from vegetables    10 grams protein per serving      -Whole Grain Pancakes Mixes:   [place] Cakes: Power Cakes à 100% whole grain buttermilk flapjack & waffle mix   FlapJacked: Protein pancake & baking mix       -Grab and Go Protein Muffin:   FlapJacked: Mighty Muffin [typically found on baking aisle]      -Whole Grain Frozen Waffles:   Kashi GO Protein Waffles   [place] Cakes Gluten Free and Whole Grain Protein Waffles   Van's Power Grains Original      -Frozen Breakfast Sandwiches  Bowls for On-The-Go:   Vel Mehta' (English muffin)   Vel Mehta' Egg'Wich (the breadless breakfast)   Vel Rand Egg Bites   GoodFood [egg white wagner breakfast sandwich]   EVOL: Morning bowls  Lean and fit options      -Cold Cereals:   KashiGO grain free  Dark cocoa + cinnamon vanilla   [place]ui Crunch keto-friendly cereals (GF)   :ratio KETO friendly cereal   Iwon organic protein crunchies   Three Wishes (GF)   Magic Spoon: grain-free cereals [Target or order on their website] (GF)   Also available in Single Serve Cups [[employer]]   Special K PROTEIN   Premier Protein          - Hot Cereals-Grab & Go Oatmeal Cups:   Powerful overnight oats   J[place]: Gluten free oatmeal with flax and Abdiel   Wild Friends: Oats & nut butter      -Whole Grain Chips:   SunChips   Beanhellen's  Beanfields bean chips   PopCorners- Flex Energy packed protein crisps       -High Protein Chips:   iwon organics protein  stix  protein puffs   Quest   Protes      -Whole Grain Crackers:   Triscuits - Regular + thin crisps   Wheat Thins   Blue Lacey almond nut thins   Faustina's Gone Cracker   Crunchmaster protein sea salt cracker      - Red Sauce (In A Jar):   Real & Odalis's Heart Smart Sauce    Classico Original   Engine 2 Plant-Strong      -Protein  Granola Bars:   [place] Valley Protein Chewy    Barebells  Kashi Go Protein Bar    KIND Protein + KIND Bars (with 7 grams sugar or less)    Rx Bar    Kashi Grain Free Coconut [place]   Oatmega Bars   :ratio KETO friendly crunchy bar      -Ready-to-Drink Protein Drinks:   Premier Protein  Iconic Grass-Fed Protein Drink   Orgain Clean- grass-fed + plant based   OWYN Plant-Based Protein Shake   Fairlife 30-gram Protein Drink   [facility] CORE POWER Protein Drink      -Nut [place] & Jellies:   Valente's Nut [place]   MaraNatha Nut [place]   PBfit Protein Peanut Butter & [place] Butter   SunButter, unsweetened   Jelly: Polaner's All Fruit NSA (no sugar added)      -Frozen Meals à Single Serving:   Healthy Choice: MAX    Healthy Choice: POWER Bowls   Happi Foodi: Carb Wise [Keto meal]   'Life Cuisine' Keto pizza   Realgood Co: Real Enchilada's   Eating Well: [Rouses]   Quest: Thin crust  low carb       -Frozen Meals à Bulk Low Carb Options:   Mike'flour Foods: Keto Lasagna   Realgood: Low carb Lightly breaded chicken strips/nuggets   Nature'sintent: Baked Cauli & Cheese [[facility] only]   Just Bare: Lightly breaded chicken breast strips/bites [[employer] only]      -[place]'s Natural Foods à Heat and Eat Entrees    Found in refrigerated section of grocery stores   You can freeze these for up to 6 months      -Already Flavored Greek Yogurt:   Oikos Pro (20 grams protein  3 grams total sugar)   Chobani Zero Sugar   Oikos Triple Zero   Two Good - All varieties    :ratio PROTEIN coconut Greek yogurt (25 grams protein  3 grams total sugar)      -BBQ Sauce:    ALLAN all natural    Primal Kitchen Classic BBQ  sauce      -Salad Dressings:   Marcus's Salad Dressing: Sensation, Balsamic, Strawberry, Avocado, Caesar, Creole Ranch, Sweet Creole Mustard, Garlic & Red Wine?   Primal Kitchen- all varieties??   Maxime's Own - Balsamic Vinaigrette, Classic Oil & Vinegar?      -Better-For-You Ice Cream  Ice Cream Bars:   Halo Top: high protein ice cream pints - regular and keto series   Enlightened 'Light' ice cream   Halo Top: Pops   KETO: pint ice cream bars   Enlightened ice cream bar   Yasso Frozen Greek yogurt bar   KIND: Frozen treat bars      - Collagen: 'Vital Proteins' Collagen Peptides, unflavored:    Pantry staple   Can be added to soups, hummus, coffee, etc to make higher protein         -Supplements:   Vitamin D3: 1,000 i.u per day    Fish Oil: Look for at least 1200 mg EPA + DHA per 2 capsules   Nordic's Natural's Ultimate Omega   MVI One-A-Day:   [facility] Robert Men -or- [facility]'s Women's Multivitamin Ultra Robert       To schedule/reschedule a nutrition follow-up appointment, please call [facility] at [phone]

## 2024-06-04 NOTE — PROGRESS NOTES
"Nutrition Assessment    Visit Type: Insurance initial  Session Time:  2 Hours  Reason for MNT visit: Pt in for education and nutrition counseling regarding Fatty Liver, Obesity, and desired weight loss .       Age: 28 y.o.  Wt:   Wt Readings from Last 1 Encounters:   06/04/24 (!) 156.5 kg (345 lb)     Ht:   Ht Readings from Last 1 Encounters:   06/04/24 5' 9" (1.753 m)     BMI:   BMI Readings from Last 1 Encounters:   06/04/24 50.95 kg/m²       Client states:  He has been heavy for most of his life, but he wants to lose weight to promote his overall health and feel better. Anderson had gotten up to his highest weight when living in [place] for work due to his frequency of eating out. He recently moved home and has access to more home cooked meals, so he has lost some weight. Anderson currently does not exercise and reports he is mostly sedentary - cuts grass 1 day/week. He is hoping to start an exercise regimen, but is overwhelmed with where and how to start. RD provided support and encouragement.    Medical History  Problem List             Resolved    Hepatic steatosis         Anxiety         NONI (obstructive sleep apnea)         Sleep paralysis         GERD (gastroesophageal reflux disease)         Insomnia         Allergic rhinitis due to animal hair and dander         Severe obesity (BMI >= 40) (Chronic)            Past Medical History:   Diagnosis Date    Abnormal stress ECG with treadmill 06/29/2021    Asthma     C. 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   Rule Out    Cluster B personality disorder 02/07/2023    Cubital tunnel syndrome on left 01/12/2021    Eczema 04/13/2021    Generalized anxiety disorder 03/22/2021    History of infection of intestine due to Clostridioides difficile 06/28/2016    Hyperlipidemia        Past Surgical History:   Procedure Laterality Date    carpal " and cubital tunnel release      COLONOSCOPY N/A 8/14/2017    Procedure: COLONOSCOPY;  Surgeon: Tate Sexton MD;  Location: [facility] ENDO ([address] [place] [address]);  Service: Endoscopy;  Laterality: N/A;    ENDOSCOPIC ULTRASOUND OF UPPER GASTROINTESTINAL TRACT N/A 9/20/2021    Procedure: ULTRASOUND, UPPER GI TRACT, ENDOSCOPIC;  Surgeon: Yolanda Dowd MD;  [facility] END[facility];  Service: Endoscopy;  Laterality: N/A;  EGD and linear EUS with 19 gauge sharkcore, formalin.     ULNAR NERVE TRANSPOSITION      WISDOM TOOTH EXTRACTION            Medications    Prior to Admission medications    Medication Sig Start Date End Date Taking? Authorizing Provider   divalproex ER (DEPAKOTE ER) 500 MG Tb24 Take 2 tablets (1,000 mg total) by mouth once daily. 3/28/24 6/26/24  Flynn Stevenson MD        Vitamins, Minerals, and/or Supplements:  Folic Acid     Food Allergies or Intolerances:  NKFAI     Social History    Marital status:  Single    Social History     Tobacco Use    Smoking status: Former    Smokeless tobacco: Never   Substance Use Topics    Alcohol use: Not Currently     Alcohol/week: 2.0 standard drinks of alcohol     Types: 2 Cans of beer per week     Current Alcohol use: 2-4 times/month    Lab Reports   Reviewed and noted    Lab Results   Component Value Date    CJETBKFC29PX 18 (L) 05/14/2018    TSH 2.715 04/01/2024    FREET4 0.92 04/01/2024    AST 25 04/01/2024    ALT 45 (H) 04/01/2024    HDL 33 (L) 04/01/2024    LDLCALC 110.6 04/01/2024    TRIG 227 (H) 04/01/2024    HGBA1C 5.6 04/01/2024    HGBA1C 6.0 (H) 03/21/2022    HGBA1C 5.7 (H) 10/05/2021         BP Readings from Last 1 Encounters:   03/28/24 130/84        24-hour Recall     Lunch: protein + vegetables  Dinner: protein + vegetables + starch  Snack: Carb Smart ice cream bar // SF pudding        Beverages   Water: 60-80 fl ounces/day  Coffee: 16 fl ounces/day, black    LIFESTYLE FACTORS    Dinning out: 3-4 x per week, mostly restaurants, mostly fast food, mostly take  out    Meal preparation/shopping:     Self and mother    Sleep: fair    Stress Level: low    Support System:  parents and friends    Exercise Regimen: Sedentary (little or no exercise)      Diagnosis    Obesity related to attitudes/beliefs around food as evidenced by patient report and BMI>30.      Intervention    Estimated Energy Requirements:   Calories: 2600  Protein: 195-225 grams  Carbohydrates: 210-230 grams  Total Fat: 80-90 grams  Baseline for fluids: 85 fl ounces/day + sweat loss    Recommendations & Goals:  Patient goals and recommendations are tailored to the specific patient's needs, readiness to change, lifestyle, culture, skills, resources, & abilities. Strategies to help achieve these nutrition-related goals were discussed which can include but are not limited to SMART goal setting & mindful eating.     Aim for a minimum of 7 hours sleep   Exercise 60 minutes most days  Eat breakfast within 1-2 hours of waking up  Try not to skip any meals or snacks, not going more than 3-4 hours without eating   At each meal and snack, try to include a source of fiber + lean protein + healthy fat     Written Materials Provided  These resources are intended to assist the patient in making it easier to choose recommended options when eating out & to identify better-for-you brands at the grocery store:    Meal Planning Guide with recommendations discussed along with portion sizes and a customized meal plan   Fueling Well On-the-Go Food Guide  Eat Fit Shopping Guide  Lifestyle Nutrition Meal Guide  RD contact information    Goals:   -  Eat every 3-4 hours.   -  Incorporate a source of lean protein, fiber, and healthy fat with each meal and snack.   -  Walk for at least 30 minutes 2 days/week. Increase duration and frequency as tolerated.       Monitoring/Evaluation    Monitor the following:  Weight  Sleep  Stress Management  Movement  Nutrient intake in reference to meal plan    Communicated with healthcare provider and  documented plan for referral to appropriate agency/healthcare provider as needed    Supervising Physician: Gabe Gamboa MD    Patient motivation, anticipated barriers, expected compliance: Patient is motivated and has verbalized understanding and intent to comply.     Comprehension: good     Follow-up:

## 2024-06-10 ENCOUNTER — PATIENT MESSAGE (OUTPATIENT)
Dept: INTERNAL MEDICINE | Facility: CLINIC | Age: 29
End: 2024-06-10
Payer: MEDICAID

## 2024-06-19 ENCOUNTER — OFFICE VISIT (OUTPATIENT)
Dept: PSYCHIATRY | Facility: CLINIC | Age: 29
End: 2024-06-19
Payer: MEDICAID

## 2024-06-19 ENCOUNTER — PATIENT MESSAGE (OUTPATIENT)
Dept: PSYCHIATRY | Facility: CLINIC | Age: 29
End: 2024-06-19
Payer: MEDICAID

## 2024-06-19 VITALS
WEIGHT: 315 LBS | HEART RATE: 95 BPM | BODY MASS INDEX: 52.04 KG/M2 | DIASTOLIC BLOOD PRESSURE: 58 MMHG | SYSTOLIC BLOOD PRESSURE: 130 MMHG

## 2024-06-19 DIAGNOSIS — F31.9 BIPOLAR DISORDER WITH PSYCHOTIC FEATURES: Primary | ICD-10-CM

## 2024-06-19 DIAGNOSIS — F41.9 ANXIETY: ICD-10-CM

## 2024-06-19 PROCEDURE — 3075F SYST BP GE 130 - 139MM HG: CPT | Mod: CPTII,,, | Performed by: NURSE PRACTITIONER

## 2024-06-19 PROCEDURE — 99212 OFFICE O/P EST SF 10 MIN: CPT | Mod: PBBFAC | Performed by: NURSE PRACTITIONER

## 2024-06-19 PROCEDURE — 90792 PSYCH DIAG EVAL W/MED SRVCS: CPT | Mod: ,,, | Performed by: NURSE PRACTITIONER

## 2024-06-19 PROCEDURE — 3078F DIAST BP <80 MM HG: CPT | Mod: CPTII,,, | Performed by: NURSE PRACTITIONER

## 2024-06-19 PROCEDURE — 99999 PR PBB SHADOW E&M-EST. PATIENT-LVL II: CPT | Mod: PBBFAC,SA,HB, | Performed by: NURSE PRACTITIONER

## 2024-06-19 PROCEDURE — 3044F HG A1C LEVEL LT 7.0%: CPT | Mod: CPTII,,, | Performed by: NURSE PRACTITIONER

## 2024-06-19 RX ORDER — DIVALPROEX SODIUM 500 MG/1
1000 TABLET, FILM COATED, EXTENDED RELEASE ORAL NIGHTLY
Qty: 180 TABLET | Refills: 3 | Status: SHIPPED | OUTPATIENT
Start: 2024-06-19

## 2024-06-19 RX ORDER — LEVOMEFOLATE CALCIUM 15 MG
15 TABLET ORAL DAILY
COMMUNITY

## 2024-06-19 NOTE — PROGRESS NOTES
"Outpatient Psychiatry Initial Visit (MD/NP)    6/19/2024    Anderson Mcclain, a 28 y.o. male, presenting for initial evaluation visit. Met with patient.    Reason for Encounter: Referral from Dr. Flynn Stevenson . Patient complains of   Chief Complaint   Patient presents with    Mood     Chief compliant in patient's words: "I was diagnosed with Bipolar in 2018."    BRIEF SOCIAL HISTORY:    Living situation: lives with mother, moved back in March 2024, had been living in [place]  Relationships:   Academic hx: bachelor's degree in   Developmental hx: raised by both parents.   Occupational hx: previously employed for [employer]. Most recently as an .  Hobbies/activities: video games, streaming     HISTORY OF PRESENT ILLNESS:    Bipolar    Patient reports a hx of bipolar disorder with psychosis. Diagnosis made during his first hospitalization in 2018. Patient endorses a manic episode with psychotic sx with included grandiose delusions and paranoia. He remembers thinking that he was the President of a secret club at [facility], and that people were following him when traveling places. He endorses additional sx of decreased need for sleep, flight of ideas, increased energy, and impulsie and reckless behavior. Patient reports being prescribed lithium at that time with good effect, taking the medication from [id number] with relative mood stability. He then self-discontinued the medication resulting in reemergence of liberty and psychosis. Patient hospitalized 3x between November 2022 and February 2023. Trialed on quetiapine, lamotrigine, and olanzapine without effect. Started on Depakote ER during February 2023 hospitalization and has continued on medication since then. He reports no manic episodes since February 2023. Patient also denies psychotic sx in that time span, and does not believe they have ever occurred outside of manic episodes. Of note, patient reports significant legal (charges, " retirement), occupational (fired), and social dysfunction due to manic sx.     Depression    Patient reports relative stability in mood over the past year. He does note feeling more dysphoric over the past 2 months. 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 Patient finds that lately he feels down at times. Has difficulty motivating himself to exercise and engage in hobbies outside of video games. He denies significant changes in his sleep, appetite, or concentration. He reports low energy though is unsure if this is associated with lack of exercise, or possibly a side effect of his medication. He denies passive thoughts of death or SI. Denies hx of active SI, no suicide attempts, no SIB.    PHQ-2 = 2    Anxiety    Patient denies excessive worry that is difficult to control lasting for a period > 6 months. Reports times where anxiety can be elevated, however it is not pervasive and persistent. No history of defined panic attacks. No social phobia. No agoraphobia.    CASSIDY-7 = 3    Psychosis     As above    OCD     The patient does not present with symptoms consistent with OCD -- absence of intrusive obsessions and accompanying time consuming compulsions.       Trauma, abuse, and violence hx -- denies    Substance use -- nicotine, vaping. Occasional ETOH use, denies hx of abuse. Previous dependence on cannabis, has not smoked in 1 year. Hx of recreational cocaine use in college. No other hx of substance use.    Legal hx -- yes, petty frost, olgaassing.    PSYCHIATRIC HISTORY:    Psychiatric Care (current & past): diagnosed with bipolar disorder during psychiatric hospitalization in [place] in 2018. Also with hx of cannabis use disorder. Most recently seen by psychiatrist while living in [place].  History of Psychotherapy: yes, currently has psychologist, in-person, q2 weeks   Previous Psychiatric Hospitalizations:  most recently in February 2023. Hx of 4 total hospitalizations. 1x residential program.   Previous Suicide Attempts: no  History of Violence: yes required restraints in ED when manic  Access to firearms: no    Current medications -- Depakote ER 1,000 mg qHS (since February 2023, VPA = 28.0 on 02/06/23), l-methylfolate 15 mg     Previous medication trials -- lithium (1st medication in 2018, stopped in 2020), olanzapine (20 mg, no recollection), quetiapine (200 mg, ineffective), lamotrigine (100 mg BID, ineffective), sertraline, buspirone     Family MH history -- father (alcoholism, substance abuse), maternal grandfather (suicide)    MEDICAL HISTORY:     Hepatic steatosis. Allergies as documented in chart. No regular use of OTC medications or herbal remedies. No hx of seizures. No hx of head injury with LOC. No cardiac hx. No thyroid hx.     PSYCHIATRIC ROS:  Complete psychiatric review of systems performed covering symptoms of depression, anxiety, liberty, psychosis, PTSD, OCD, ADHD, and eating disorders performed. Pertinent findings as mentioned in the HPI; otherwise, patient answers are not diagnostic of other disorders and will continued to be assessed in further appointments.        Review Of Systems:     GENERAL:  No weight gain or loss  SKIN:  No rashes or lacerations  HEAD:  No headaches  EYES:  No exophthalmos, jaundice or blindness  EARS:  No dizziness, tinnitus or hearing loss  NOSE:  No changes in smell  MOUTH & THROAT:  No dyskinetic movements or obvious goiter  CHEST:  No shortness of breath, hyperventilation or cough  CARDIOVASCULAR:  No tachycardia or chest pain  ABDOMEN:  No nausea, vomiting, pain, constipation or diarrhea  URINARY:  No frequency, dysuria or sexual dysfunction  ENDOCRINE:  No polydipsia, polyuria  MUSCULOSKELETAL:  No pain or stiffness of the joints  NEUROLOGIC:  No weakness, sensory changes, seizures, confusion, memory loss, tremor or other abnormal movements    Current Evaluation:      Nutritional Screening: Considering the patient's height and weight, medications, medical history and preferences, should a referral be made to the dietitian? no    Constitutional  Vitals:  Most recent vital signs, dated less than 90 days prior to this appointment, were reviewed.    Vitals:    06/19/24 1301   BP: (!) 130/58   Pulse: 95   Weight: (!) 159.8 kg (352 lb 6.5 oz)        General:  age appropriate, obese     Musculoskeletal  Muscle Strength/Tone:  no spasicity, no rigidity   Gait & Station:  non-ataxic     Psychiatric  Speech:  no latency; no press   Mood & Affect:  dysphoric  full   Thought Process:  normal and logical   Associations:  intact   Thought Content:  normal, no suicidality, no homicidality, delusions, or paranoia   Insight:  intact   Judgement: behavior is adequate to circumstances   Orientation:  grossly intact   Memory: intact for content of interview   Language: grossly intact   Attention Span & Concentration:  able to focus   Fund of Knowledge:  intact and appropriate to age and level of education       Relevant Elements of Neurological Exam: normal gait    Functioning in Relationships: see above    Laboratory Data  No visits with results within 1 Month(s) from this visit.   Latest known visit with results is:   Lab Visit on 04/01/2024   Component Date Value Ref Range Status    Sodium 04/01/2024 139  136 - 145 mmol/L Final    Potassium 04/01/2024 4.6  3.5 - 5.1 mmol/L Final    Chloride 04/01/2024 103  95 - 110 mmol/L Final    CO2 04/01/2024 25  23 - 29 mmol/L Final    Glucose 04/01/2024 88  70 - 110 mg/dL Final    BUN 04/01/2024 12  6 - 20 mg/dL Final    Creatinine 04/01/2024 0.8  0.5 - 1.4 mg/dL Final    Calcium 04/01/2024 9.3  8.7 - 10.5 mg/dL Final    Total Protein 04/01/2024 6.8  6.0 - 8.4 g/dL Final    Albumin 04/01/2024 3.7  3.5 - 5.2 g/dL Final    Total Bilirubin 04/01/2024 0.5  0.1 - 1.0 mg/dL Final    Alkaline Phosphatase 04/01/2024 51 (L)  55 - 135 U/L Final    AST 04/01/2024  25  10 - 40 U/L Final    ALT 04/01/2024 45 (H)  10 - 44 U/L Final    eGFR 04/01/2024 >60.0  >60 mL/min/1.73 m^2 Final    Anion Gap 04/01/2024 11  8 - 16 mmol/L Final    WBC 04/01/2024 10.40  3.90 - 12.70 K/uL Final    RBC 04/01/2024 4.63  4.60 - 6.20 M/uL Final    Hemoglobin 04/01/2024 13.7 (L)  14.0 - 18.0 g/dL Final    Hematocrit 04/01/2024 42.4  40.0 - 54.0 % Final    MCV 04/01/2024 92  82 - 98 fL Final    MCH 04/01/2024 29.6  27.0 - 31.0 pg Final    MCHC 04/01/2024 32.3  32.0 - 36.0 g/dL Final    RDW 04/01/2024 13.0  11.5 - 14.5 % Final    Platelets 04/01/2024 287  150 - 450 K/uL Final    MPV 04/01/2024 10.9  9.2 - 12.9 fL Final    Immature Granulocytes 04/01/2024 0.4  0.0 - 0.5 % Final    Gran # (ANC) 04/01/2024 5.6  1.8 - 7.7 K/uL Final    Immature Grans (Abs) 04/01/2024 0.04  0.00 - 0.04 K/uL Final    Lymph # 04/01/2024 3.7  1.0 - 4.8 K/uL Final    Mono # 04/01/2024 0.7  0.3 - 1.0 K/uL Final    Eos # 04/01/2024 0.3  0.0 - 0.5 K/uL Final    Baso # 04/01/2024 0.08  0.00 - 0.20 K/uL Final    nRBC 04/01/2024 0  0 /100 WBC Final    Gran % 04/01/2024 54.2  38.0 - 73.0 % Final    Lymph % 04/01/2024 35.4  18.0 - 48.0 % Final    Mono % 04/01/2024 6.5  4.0 - 15.0 % Final    Eosinophil % 04/01/2024 2.7  0.0 - 8.0 % Final    Basophil % 04/01/2024 0.8  0.0 - 1.9 % Final    Differential Method 04/01/2024 Automated   Final    Cholesterol 04/01/2024 189  120 - 199 mg/dL Final    Triglycerides 04/01/2024 227 (H)  30 - 150 mg/dL Final    HDL 04/01/2024 33 (L)  40 - 75 mg/dL Final    LDL Cholesterol 04/01/2024 110.6  63.0 - 159.0 mg/dL Final    HDL/Cholesterol Ratio 04/01/2024 17.5 (L)  20.0 - 50.0 % Final    Total Cholesterol/HDL Ratio 04/01/2024 5.7 (H)  2.0 - 5.0 Final    Non-HDL Cholesterol 04/01/2024 156  mg/dL Final    Hemoglobin A1C 04/01/2024 5.6  4.0 - 5.6 % Final    Estimated Avg Glucose 04/01/2024 114  68 - 131 mg/dL Final    TSH 04/01/2024 2.715  0.400 - 4.000 uIU/mL Final    Free T4 04/01/2024 0.92  0.71 - 1.51  ng/dL Final    Hepatitis C Ab 04/01/2024 Non-reactive  Non-reactive Final    HIV 1/2 Ag/Ab 04/01/2024 Non-reactive  Non-reactive Final         Medications  Outpatient Encounter Medications as of 6/19/2024   Medication Sig Dispense Refill    divalproex ER (DEPAKOTE ER) 500 MG Tb24 24 hr tablet Take 2 tablets (1,000 mg total) by mouth every evening. 180 tablet 3    levomefolate calcium (L-METHYLFOLATE) 15 mg Tab Take 15 mg by mouth once daily.      [DISCONTINUED] divalproex ER (DEPAKOTE ER) 500 MG Tb24 Take 2 tablets (1,000 mg total) by mouth once daily. 180 tablet 0     No facility-administered encounter medications on file as of 6/19/2024.           Assessment - Diagnosis - Goals:     Impression: Anderson Mcclain is a 28 y.o. male with a psychiatric hx of bipolar disorder presenting with symptoms consistent with bipolar disorder with psychotic features. Medical hx significant for hepatic steatosis. Family MH hx is significant for substance abuse (father) and suicide (maternal grandfather). Initial diagnosis of bipolar disorder at 23 y/o during 1st hospitalization for liberty/psychosis. Poor response to antipsychotic trials. Positive response to both lithium and Depakote. Hx of 4 psychiatric hospitalizations for liberty/psychosis. No hx of SA, SIB. Hx of violence (physical/verbal aggression) when decompensated. Hx of cannabis dependence. Moved back in with mother in March 2024. Currently unemployed.         ICD-10-CM ICD-9-CM   1. Bipolar disorder with psychotic features  F31.9 296.80   2. Anxiety  F41.9 300.00       Strengths and Liabilities: Strength: Patient accepts guidance/feedback, Strength: Patient is expressive/articulate., Strength: Patient is intelligent., Strength: Patient is motivated for change., Strength: Patient has positive support network., Strength: Patient has reasonable judgment., Liability: Patient lacks coping skills.    Treatment Goals:  Specify outcomes written in observable, behavioral terms:    Maintain mood stability by adequately treated sx of liberty and psychosis    Treatment Plan/Recommendations:   Reviewed patient's current sx, medication regimen, and psychiatric hx   We agreed to continue Depakote ER 1,000 mg qHS  Labs ordered  Explained VPA trough level   I plan to further evaluate his sleep, may benefit from sleep medicine referral  Will also continue to reassess mood sx    Medication Management  Prescription drug management was employed during the encounter, as medications were prescribed, or considered but not prescribed.   [facility] reviewed  The risks and benefits of medication were discussed with the patient.  Possible expectable adverse effects of any current or proposed individual psychotropic agents were discussed with this patient.  Counseling was provided on the importance of full compliance with any prescribed medication.  Detailed instructions were provided to the patient regarding the administration of any prescribed medication.  Patient voiced understanding    Return to Clinic: 1 month    I spent a total of 72 minutes on the day of the visit.This includes face to face time and non-face to face time preparing to see the patient (eg, review of tests), obtaining and/or reviewing separately obtained history, documenting clinical information in the electronic or other health record, independently interpreting results and communicating results to the patient/family/caregiver, or care coordinator.

## 2024-07-02 ENCOUNTER — LAB VISIT (OUTPATIENT)
Dept: LAB | Facility: HOSPITAL | Age: 29
End: 2024-07-02
Payer: MEDICAID

## 2024-07-02 DIAGNOSIS — F31.9 BIPOLAR DISORDER WITH PSYCHOTIC FEATURES: ICD-10-CM

## 2024-07-02 LAB
IRON SERPL-MCNC: 57 UG/DL (ref 45–160)
SATURATED IRON: 18 % (ref 20–50)
TESTOST SERPL-MCNC: 55 NG/DL (ref 304–1227)
TOTAL IRON BINDING CAPACITY: 314 UG/DL (ref 250–450)
TRANSFERRIN SERPL-MCNC: 212 MG/DL (ref 200–375)
VALPROATE SERPL-MCNC: 17.4 UG/ML (ref 50–100)

## 2024-07-02 PROCEDURE — 83540 ASSAY OF IRON: CPT | Performed by: NURSE PRACTITIONER

## 2024-07-02 PROCEDURE — 80164 ASSAY DIPROPYLACETIC ACD TOT: CPT | Performed by: NURSE PRACTITIONER

## 2024-07-02 PROCEDURE — 84403 ASSAY OF TOTAL TESTOSTERONE: CPT | Performed by: NURSE PRACTITIONER

## 2024-07-02 PROCEDURE — 36415 COLL VENOUS BLD VENIPUNCTURE: CPT | Performed by: NURSE PRACTITIONER

## 2024-07-02 PROCEDURE — 82652 VIT D 1 25-DIHYDROXY: CPT | Performed by: NURSE PRACTITIONER

## 2024-07-03 ENCOUNTER — PATIENT MESSAGE (OUTPATIENT)
Dept: PSYCHIATRY | Facility: CLINIC | Age: 29
End: 2024-07-03
Payer: MEDICAID

## 2024-07-08 LAB — 1,25(OH)2D3 SERPL-MCNC: 65 PG/ML (ref 20–79)

## 2024-07-11 ENCOUNTER — OFFICE VISIT (OUTPATIENT)
Dept: INTERNAL MEDICINE | Facility: CLINIC | Age: 29
End: 2024-07-11
Payer: MEDICAID

## 2024-07-11 VITALS
OXYGEN SATURATION: 98 % | HEIGHT: 69 IN | WEIGHT: 315 LBS | HEART RATE: 101 BPM | SYSTOLIC BLOOD PRESSURE: 124 MMHG | BODY MASS INDEX: 46.65 KG/M2 | DIASTOLIC BLOOD PRESSURE: 74 MMHG

## 2024-07-11 DIAGNOSIS — R79.89 LOW TESTOSTERONE: Primary | ICD-10-CM

## 2024-07-11 PROCEDURE — 99213 OFFICE O/P EST LOW 20 MIN: CPT | Mod: S$PBB,,,

## 2024-07-11 PROCEDURE — 3044F HG A1C LEVEL LT 7.0%: CPT | Mod: CPTII,,,

## 2024-07-11 PROCEDURE — 99999 PR PBB SHADOW E&M-EST. PATIENT-LVL III: CPT | Mod: PBBFAC,,,

## 2024-07-11 PROCEDURE — 3008F BODY MASS INDEX DOCD: CPT | Mod: CPTII,,,

## 2024-07-11 PROCEDURE — 99213 OFFICE O/P EST LOW 20 MIN: CPT | Mod: PBBFAC

## 2024-07-11 PROCEDURE — 3074F SYST BP LT 130 MM HG: CPT | Mod: CPTII,,,

## 2024-07-11 PROCEDURE — 3078F DIAST BP <80 MM HG: CPT | Mod: CPTII,,,

## 2024-07-11 NOTE — PROGRESS NOTES
I have reviewed the notes, assessments, and/or procedures performed by Resident, I concur with her/his documentation of Anderson Mcclain.  Date of Service: 7/11/2024    Patient with random low testosteone reading.  Will repeat Testosterone at 8 am and 2' workup if appropriate   No adenopathy or splenomegaly. No cervical or inguinal lymphadenopathy.

## 2024-07-11 NOTE — PROGRESS NOTES
[facility] Medicine Resident [facility]   Clinic Note    Patient Name: Anderson Mcclain   YOB: 1995     SUBJECTIVE:     Chief Complaint: Low testosterone results    History of Present Illness:  Mr. Anderson Mcclain is a 28 y.o. male with history of bipolar disorder and BMI 52 who presents for follow-up for abnormal test results. Patient reports history of 4-5 months of reduced energy and increased lethargy with exercise intolerance. He says he has been following with Psychiatry for his mood disorder which has been stable and well controlled on Depakote. At his last visit, he discussed his generalized fatigue and his psychiatrist ordered testosterone level which were significantly reduced at 55, though drawn at noon and not in the morning. Patient is concerned regarding how low his levels are. Reports never having T-levels checked in the past. Reports stable weight, but per chart review appears pt may have gained 7-8 pounds over the last few clinic visits. He says he has been trying to eat healthier since moving home with his mother with regular home cooked meals vs takeout every day. Reports very limited physical exercise and primarily sedentary lifestyle. ROS is unremarkable otherwise, asides from generalized fatigue and malaise.    Review of Systems   Constitutional:  Positive for malaise/fatigue. Negative for chills, fever and weight loss.   HENT:  Negative for congestion and sore throat.    Eyes:  Negative for double vision and photophobia.   Respiratory:  Negative for cough and shortness of breath.    Cardiovascular:  Negative for chest pain and palpitations.   Gastrointestinal:  Negative for abdominal pain and vomiting.   Genitourinary:  Negative for dysuria and urgency.   Musculoskeletal:  Negative for myalgias and neck pain.   Neurological:  Negative for dizziness and weakness.   Psychiatric/Behavioral:  Negative for depression. The patient is not nervous/anxious.        PAST HISTORY:     Past  Medical History:   Diagnosis Date    Abnormal stress ECG with treadmill 06/29/2021    Asthma     C. 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   Rule Out    Cluster B personality disorder 02/07/2023    Cubital tunnel syndrome on left 01/12/2021    Eczema 04/13/2021    Generalized anxiety disorder 03/22/2021    History of infection of intestine due to Clostridioides difficile 06/28/2016    Hyperlipidemia        Past Surgical History:   Procedure Laterality Date    carpal and cubital tunnel release      COLONOSCOPY N/A 8/14/2017    Procedure: COLONOSCOPY;  Surgeon: Tate Sexton MD;  [facility]  Service: Endoscopy;  Laterality: N/A;    ENDOSCOPIC ULTRASOUND OF UPPER GASTROINTESTINAL TRACT N/A 9/20/2021    Procedure: ULTRASOUND, UPPER GI TRACT, ENDOSCOPIC;  Surgeon: Yolanda Dowd MD;  Location: [facility] ENDO;  Service: Endoscopy;  Laterality: N/A;  EGD and linear EUS with 19 gauge sharkcore, formalin.     ULNAR NERVE TRANSPOSITION      WISDOM TOOTH EXTRACTION         Family History   Problem Relation Name Age of Onset    Diabetes Mother      Diabetes Maternal Uncle      Diabetes Maternal Grandmother      Diverticulosis Father      Glaucoma Paternal Grandfather      Celiac disease Neg Hx      Colon cancer Neg Hx      Colon polyps Neg Hx      Esophageal cancer Neg Hx      Inflammatory bowel disease Neg Hx      Irritable bowel syndrome Neg Hx      Stomach cancer Neg Hx         Social History     Socioeconomic History    Marital status: Single   Tobacco Use    Smoking status: Former    Smokeless tobacco: Never   Substance and Sexual Activity    Alcohol use: Not Currently     Alcohol/week: 2.0 standard drinks of alcohol     Types: 2 Cans of beer per week    Drug use: No    Sexual activity: Not Currently     Social Determinants of Health     Financial  "Resource Strain: Low Risk  (6/4/2024)    Overall Financial Resource Strain (CARDIA)     Difficulty of Paying Living Expenses: Not hard at all   Food Insecurity: No Food Insecurity (6/4/2024)    Hunger Vital Sign     Worried About Running Out of Food in the Last Year: Never true     Ran Out of Food in the Last Year: Never true    [facility]    Transportation Needs   Physical Activity: Unknown (6/4/2024)    Exercise Vital Sign     Days of Exercise per Week: 0 days   Stress: No Stress Concern Present (6/4/2024)    [employer] - Occupational Stress Questionnaire     Feeling of Stress : Only a little   Housing Stability: Unknown (6/4/2024)    Housing Stability Vital Sign     Unable to Pay for Housing in the Last Year: No       MEDICATIONS & ALLERGIES:     Current Outpatient Medications on File Prior to Visit   Medication Sig    divalproex ER (DEPAKOTE ER) 500 MG Tb24 24 hr tablet Take 2 tablets (1,000 mg total) by mouth every evening.    levomefolate calcium (L-METHYLFOLATE) 15 mg Tab Take 15 mg by mouth once daily.     No current facility-administered medications on file prior to visit.       Review of patient's allergies indicates:  No Known Allergies    OBJECTIVE:     Vital Signs:  Vitals:    07/11/24 1446   BP: 124/74   BP Location: Right arm   Patient Position: Sitting   Pulse: 101   SpO2: 98%   Weight: (!) 162.6 kg (358 lb 7.5 oz)   Height: 5' 9" (1.753 m)       Body mass index is 52.94 kg/m².     Physical Exam  Vitals reviewed.   Constitutional:       Appearance: Normal appearance. He is obese.   HENT:      Head: Normocephalic and atraumatic.   Eyes:      Extraocular Movements: Extraocular movements intact.      Conjunctiva/sclera: Conjunctivae normal.   Cardiovascular:      Rate and Rhythm: Normal rate and regular rhythm.      Pulses: Normal pulses.      Heart sounds: Normal heart sounds.   Pulmonary:      Effort: Pulmonary " "effort is normal. No respiratory distress.      Breath sounds: Normal breath sounds.   Abdominal:      General: Abdomen is flat.      Palpations: Abdomen is soft.      Tenderness: There is no abdominal tenderness.   Musculoskeletal:         General: No tenderness. Normal range of motion.      Cervical back: Normal range of motion.   Skin:     General: Skin is warm.      Findings: No rash.   Neurological:      Mental Status: He is alert and oriented to person, place, and time. Mental status is at baseline.   Psychiatric:         Mood and Affect: Mood normal.         Behavior: Behavior normal.         Laboratory  Lab Results   Component Value Date    WBC 10.40 04/01/2024    HGB 13.7 (L) 04/01/2024    HCT 42.4 04/01/2024    MCV 92 04/01/2024     04/01/2024     BMP  Lab Results   Component Value Date     04/01/2024    K 4.6 04/01/2024     04/01/2024    CO2 25 04/01/2024    BUN 12 04/01/2024    CREATININE 0.8 04/01/2024    CALCIUM 9.3 04/01/2024    ANIONGAP 11 04/01/2024    EGFRNORACEVR >60.0 04/01/2024     No results found for: "INR", "PROTIME"  Lab Results   Component Value Date    HGBA1C 5.6 04/01/2024         Health Maintenance Due   Topic Date Due    Pneumococcal Vaccines (Age 0-64) (1 of 2 - PCV) Never done    HPV Vaccines (1 - Male 3-dose series) Never done       ASSESSMENT & PLAN:   Mr. Anderson Mcclain is a 28 y.o. male w/PMH of bipolar disorder and BMI 52 who presents to follow-up lab results.  -     Found to have markedly low T-level on noon time draw. He reports worsening fatigue and lack of energy over the last 4-5 months that suggests a new or worsening issue, but he tells me he is not sure if this may have been something he has struggled with throughout life. I will repeat a testosterone level at 8AM to confirm low T-levels, and will also order other hormone levels at that time to evaluate for possible secondary hypogonadism with LSH, FH, PRL, and 8am cortisol.  - Will consider " Endocrinology referral pending on results of labs. Given that patient has regular PCP, Dr. Stevenson, would prefer to defer further management of hypogonadism to usual PCP.    Low testosterone  -     TESTOSTERONE PANEL; Future; Expected date: 07/11/2024  -     FOLLICLE STIMULATING HORMONE; Future; Expected date: 07/11/2024  -     LUTEINIZING HORMONE; Future; Expected date: 07/11/2024  -     PROLACTIN; Future; Expected date: 07/11/2024  -     Cortisol, 8AM; Future; Expected date: 07/12/2024  -     FERRITIN; Future; Expected date: 07/11/2024        Return to clinic in 1 month or sooner as needed.    Patient was discussed with Dr. Benjamin.    Yogi Burns MD  Internal Medicine, PGY-3  [facility]

## 2024-07-11 NOTE — PATIENT INSTRUCTIONS
You were seen today to follow-up lab results of low testosterone. I would like to repeat this test in the morning to confirm the diagnosis of low testosterone and evaluate if there is some treatable cause of low testosterone from a secondary reason. At the same time, I plan to order other bloodwork to assess for some of those secondary causes. I will follow up with you regarding the results.

## 2024-07-18 ENCOUNTER — LAB VISIT (OUTPATIENT)
Dept: LAB | Facility: HOSPITAL | Age: 29
End: 2024-07-18
Payer: MEDICAID

## 2024-07-18 DIAGNOSIS — R79.89 LOW TESTOSTERONE: ICD-10-CM

## 2024-07-18 LAB
CORTIS SERPL-MCNC: 9 UG/DL (ref 4.3–22.4)
FERRITIN SERPL-MCNC: 181 NG/ML (ref 20–300)
FSH SERPL-ACNC: 0.62 MIU/ML (ref 0.95–11.95)
LH SERPL-ACNC: 1.4 MIU/ML (ref 0.6–12.1)
PROLACTIN SERPL IA-MCNC: 5.9 NG/ML (ref 3.5–19.4)

## 2024-07-18 PROCEDURE — 82040 ASSAY OF SERUM ALBUMIN: CPT

## 2024-07-18 PROCEDURE — 84270 ASSAY OF SEX HORMONE GLOBUL: CPT

## 2024-07-18 PROCEDURE — 84146 ASSAY OF PROLACTIN: CPT

## 2024-07-18 PROCEDURE — 84403 ASSAY OF TOTAL TESTOSTERONE: CPT

## 2024-07-18 PROCEDURE — 82728 ASSAY OF FERRITIN: CPT

## 2024-07-18 PROCEDURE — 83002 ASSAY OF GONADOTROPIN (LH): CPT

## 2024-07-18 PROCEDURE — 83001 ASSAY OF GONADOTROPIN (FSH): CPT

## 2024-07-18 PROCEDURE — 82533 TOTAL CORTISOL: CPT

## 2024-07-18 PROCEDURE — 36415 COLL VENOUS BLD VENIPUNCTURE: CPT

## 2024-07-24 ENCOUNTER — PATIENT MESSAGE (OUTPATIENT)
Dept: INTERNAL MEDICINE | Facility: CLINIC | Age: 29
End: 2024-07-24
Payer: MEDICAID

## 2024-07-24 ENCOUNTER — PATIENT MESSAGE (OUTPATIENT)
Dept: HEPATOLOGY | Facility: CLINIC | Age: 29
End: 2024-07-24
Payer: MEDICAID

## 2024-07-24 ENCOUNTER — PATIENT MESSAGE (OUTPATIENT)
Dept: SLEEP MEDICINE | Facility: CLINIC | Age: 29
End: 2024-07-24
Payer: MEDICAID

## 2024-07-25 LAB
ALBUMIN SERPL-MCNC: 4.4 G/DL (ref 3.6–5.1)
SHBG SERPL-SCNC: 4 NMOL/L (ref 10–50)

## 2024-07-29 ENCOUNTER — PATIENT MESSAGE (OUTPATIENT)
Dept: HEPATOLOGY | Facility: HOSPITAL | Age: 29
End: 2024-07-29
Payer: MEDICAID

## 2024-07-29 DIAGNOSIS — E29.1 SECONDARY MALE HYPOGONADISM: Primary | ICD-10-CM

## 2024-07-31 ENCOUNTER — OFFICE VISIT (OUTPATIENT)
Dept: PSYCHIATRY | Facility: CLINIC | Age: 29
End: 2024-07-31
Payer: MEDICAID

## 2024-07-31 VITALS
HEART RATE: 105 BPM | DIASTOLIC BLOOD PRESSURE: 80 MMHG | SYSTOLIC BLOOD PRESSURE: 133 MMHG | BODY MASS INDEX: 51.98 KG/M2 | WEIGHT: 315 LBS

## 2024-07-31 DIAGNOSIS — F31.9 BIPOLAR DISORDER WITH PSYCHOTIC FEATURES: Primary | ICD-10-CM

## 2024-07-31 DIAGNOSIS — F41.9 ANXIETY: ICD-10-CM

## 2024-07-31 PROCEDURE — 3044F HG A1C LEVEL LT 7.0%: CPT | Mod: CPTII,,, | Performed by: NURSE PRACTITIONER

## 2024-07-31 PROCEDURE — 3008F BODY MASS INDEX DOCD: CPT | Mod: CPTII,,, | Performed by: NURSE PRACTITIONER

## 2024-07-31 PROCEDURE — 99214 OFFICE O/P EST MOD 30 MIN: CPT | Mod: S$PBB,,, | Performed by: NURSE PRACTITIONER

## 2024-07-31 PROCEDURE — 3079F DIAST BP 80-89 MM HG: CPT | Mod: CPTII,,, | Performed by: NURSE PRACTITIONER

## 2024-07-31 PROCEDURE — 99212 OFFICE O/P EST SF 10 MIN: CPT | Mod: PBBFAC | Performed by: NURSE PRACTITIONER

## 2024-07-31 PROCEDURE — 3075F SYST BP GE 130 - 139MM HG: CPT | Mod: CPTII,,, | Performed by: NURSE PRACTITIONER

## 2024-07-31 PROCEDURE — 99999 PR PBB SHADOW E&M-EST. PATIENT-LVL II: CPT | Mod: PBBFAC,SA,HB, | Performed by: NURSE PRACTITIONER

## 2024-07-31 NOTE — PROGRESS NOTES
Outpatient Psychiatry Follow-Up Visit (MD/NP)    7/31/2024    Clinical Status of Patient:  Outpatient (Ambulatory)    Chief Complaint:  Anderson Mcclain is a 28 y.o. male who presents today for follow-up of mood disorder and anxiety.  Met with patient.      Interval History and Content of Current Session:    Social/medical updates -- no major social changes. Visited friend recently. 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 Referred to endocrinology for low testosterone.     Mood -- presents with dysphoric mood, congruent affect, brightens appropriately at times. Denies persistent depressed mood, denies anhedonia. No thoughts of death or SI. No hypomania or liberty.    Anxiety -- no unregulated worry or panic  Attention -- no concerns expressed  Psychosis -- no  Sleep -- poor sleep schedule, adequate duration, 8-9 hours nightly   Energy -- poor, easily fatigued, low testosterone, referred to endocrinology. Looking to implement more exercise, goal of walking 5x weekly for 1 mile.  Appetite -- adequate, weight stable, 351 lb today    Substance use -- nicotine, quitting vaping, plans on using Zyn pouches to wean off nicotine. 1x ETOH with friend. No THC or illicit substance use    Depakote ER 1,000 mg qHS -- reports compliance, denies SE    Labs:   07/02/24 -- VPA = 17.4 (completed ~ 12pm)    Brief synopsis:  Mood sx stable, no evident hypomania or liberty, denies SI/HI/AVH      Review of Systems   PSYCHIATRIC: Pertinant items are noted in the narrative.  CONSTITUTIONAL: See above.   MUSCULOSKELETAL: No pain or stiffness of the joints.  NEUROLOGIC: No weakness, sensory changes, seizures, confusion, memory loss, tremor or other abnormal movements.  GASTROINTESTINAL: No nausea, vomiting, pain, constipation or diarrhea.    Past Medical, Family and Social History: The patient's past medical, family and social history have been reviewed and updated as appropriate within the electronic medical record  - see encounter notes.    Living situation: lives with mother, moved back in March 2024, had been living in [place]  Relationships:   Academic hx: bachelor's degree in   Developmental hx: raised by both parents.   Occupational hx: previously employed for [employer]. Most recently as an .  Hobbies/activities: video games, streaming     Compliance: see above    Side effects: see above    Risk Parameters:  Patient reports no suicidal ideation  Patient reports no homicidal ideation  Patient reports no self-injurious behavior  Patient reports no violent behavior    Exam (detailed: at least 9 elements; comprehensive: all 15 elements)   Constitutional  Vitals:  Most recent vital signs, dated less than 90 days prior to this appointment, were reviewed.   Vitals:    07/31/24 1128   BP: 133/80   Pulse: 105   Weight: (!) 159.6 kg (351 lb 15.4 oz)        General:  age appropriate, obese     Musculoskeletal  Muscle Strength/Tone:  no spasicity, no rigidity   Gait & Station:  non-ataxic     Psychiatric  Speech:  no latency; no press   Mood & Affect:  dysphoric  Mildly dysphoric, brightens   Thought Process:  normal and logical   Associations:  intact   Thought Content:  normal, no suicidality, no homicidality, delusions, or paranoia   Insight:  intact   Judgement: behavior is adequate to circumstances   Orientation:  grossly intact   Memory: intact for content of interview   Language: grossly intact   Attention Span & Concentration:  able to focus   Fund of Knowledge:  intact and appropriate to age and level of education     Assessment and Diagnosis   Status/Progress: Based on the examination today, the patient's problem(s) is/are well controlled.  New problems have not been presented today.   Co-morbidities, Diagnostic uncertainty, and Lack of compliance are not complicating management of the primary condition.  There are no active rule-out diagnoses for this patient at this time.      General Impression: Anderson Mcclain is a 28 y.o. male with a psychiatric hx of bipolar disorder presenting with symptoms consistent with bipolar disorder with psychotic features. Medical hx significant for hepatic steatosis. Family MH hx is significant for substance abuse (father) and suicide (maternal grandfather). Initial diagnosis of bipolar disorder at 23 y/o during 1st hospitalization for liberty/psychosis. Poor response to antipsychotic trials. Positive response to both lithium and Depakote. Hx of 4 psychiatric hospitalizations for liberty/psychosis. No hx of SA, SIB. Hx of violence (physical/verbal aggression) when decompensated. Hx of cannabis dependence. Moved back in with mother in March 2024. Currently unemployed.     07/31/24 -- sx stable, continue Depakote 1,000 mg qHS        ICD-10-CM ICD-9-CM   1. Bipolar disorder with psychotic features  F31.9 296.80   2. Anxiety  F41.9 300.00       Intervention/Counseling/Treatment Plan   Reviewed patient's current sx and medication regimen  Continue Depakote as prescribed  VPA low, however mood sx are stable  Strongly encouraged patient to contact writer if he experiences any hypomanic/manic sx  Can titrate Depakote if needed  Discussed healthy lifestyle changes (quitting vaping, exercise)  Encouraged patient set realistic goals  Motivational interviewing    Medication Management  Prescription drug management was employed during the encounter, as medications were prescribed, or considered but not prescribed.   [facility] reviewed  The risks and benefits of medication were discussed with the patient.  Possible expectable adverse effects of any current or proposed individual psychotropic agents were discussed with this patient.  Counseling was provided on the importance of full compliance with any prescribed medication.  Detailed instructions were provided to the patient regarding the administration of any prescribed medication.  Patient voiced  understanding    Return to Clinic:  2 months

## 2024-09-24 ENCOUNTER — OFFICE VISIT (OUTPATIENT)
Dept: PSYCHIATRY | Facility: CLINIC | Age: 29
End: 2024-09-24
Payer: MEDICAID

## 2024-09-24 ENCOUNTER — PATIENT MESSAGE (OUTPATIENT)
Dept: PSYCHIATRY | Facility: CLINIC | Age: 29
End: 2024-09-24
Payer: MEDICAID

## 2024-09-24 VITALS
HEART RATE: 104 BPM | BODY MASS INDEX: 52.19 KG/M2 | DIASTOLIC BLOOD PRESSURE: 62 MMHG | SYSTOLIC BLOOD PRESSURE: 133 MMHG | WEIGHT: 315 LBS

## 2024-09-24 DIAGNOSIS — F41.9 ANXIETY: ICD-10-CM

## 2024-09-24 DIAGNOSIS — F31.9 BIPOLAR DISORDER WITH PSYCHOTIC FEATURES: Primary | ICD-10-CM

## 2024-09-24 PROCEDURE — 99999 PR PBB SHADOW E&M-EST. PATIENT-LVL II: CPT | Mod: PBBFAC,SA,HB, | Performed by: NURSE PRACTITIONER

## 2024-09-24 PROCEDURE — 99212 OFFICE O/P EST SF 10 MIN: CPT | Mod: PBBFAC | Performed by: NURSE PRACTITIONER

## 2024-09-24 NOTE — PROGRESS NOTES
Outpatient Psychiatry Follow-Up Visit (MD/NP)    9/24/2024    Clinical Status of Patient:  Outpatient (Ambulatory)    Chief Complaint:  Anderson Mcclain is a 28 y.o. male who presents today for follow-up of mood disorder and anxiety.  Met with patient.      Interval History and Content of Current Session:    Social/medical updates -- no major social changes. Pursuing employment, planning on changing career to IT/cybersecurity.    Mood -- presents with euthymic mood, mildly constricted affect (baseline). Denies persistent depressed mood, denies anhedonia. Finds that he struggles to find the motivation to do things at times, such as applying for jobs. No thoughts of death or SI. No liberty.   Anxiety -- no unregulated worry or panic  Attention -- no concerns expressed  Psychosis -- none  Sleep -- fair, poor sleep hygiene/schedule, baseline, ~ 3a - 10a.   Energy -- easily fatigued, baseline, exercising more regularly, 20 minute walks  Appetite -- adequate, weight relatively stable     Substance use -- nicotine, zyn. Occasional ETOH use. No illicit substance use.    Medications:    Depakote ER 1,000 mg qHS -- reports compliance, denies SE    Labs:   07/02/24 -- VPA = 17.4 (completed ~ 12pm)    Brief synopsis:  Mood sx stable, no evident hypomania or liberty, denies SI/HI/AVH      Review of Systems   PSYCHIATRIC: Pertinant items are noted in the narrative.  CONSTITUTIONAL: See above.   MUSCULOSKELETAL: No pain or stiffness of the joints.  NEUROLOGIC: No weakness, sensory changes, seizures, confusion, memory loss, tremor or other abnormal movements.  GASTROINTESTINAL: No nausea, vomiting, pain, constipation or diarrhea.    Past Medical, Family and Social History: The patient's past medical, family and social history have been reviewed and updated as appropriate within the electronic medical record - see encounter notes.    Living situation: lives with mother, moved back in March 2024, had been living in  [place]  Relationships:   Academic hx: bachelor's degree in   Developmental hx: raised by both parents.   Occupational hx: previously employed for [employer]. Most recently as an .  Hobbies/activities: video games, streaming     Compliance: see above    Side effects: see above    Risk Parameters:  Patient reports no suicidal ideation  Patient reports no homicidal ideation  Patient reports no self-injurious behavior  Patient reports no violent behavior    Exam (detailed: at least 9 elements; comprehensive: all 15 elements)   Constitutional  Vitals:  Most recent vital signs, dated less than 90 days prior to this appointment, were reviewed.   There were no vitals filed for this visit.       General:  age appropriate, obese     Musculoskeletal  Muscle Strength/Tone:  no spasicity, no rigidity   Gait & Station:  non-ataxic     Psychiatric  Speech:  no latency; no press   Mood & Affect:  euthymic  Mildly restricted    Thought Process:  normal and logical   Associations:  intact   Thought Content:  normal, no suicidality, no homicidality, delusions, or paranoia   Insight:  intact   Judgement: behavior is adequate to circumstances   Orientation:  grossly intact   Memory: intact for content of interview   Language: grossly intact   Attention Span & Concentration:  able to focus   Fund of Knowledge:  intact and appropriate to age and level of education     Assessment and Diagnosis   Status/Progress: Based on the examination today, the patient's problem(s) is/are well controlled.  New problems have not been presented today.   Co-morbidities, Diagnostic uncertainty, and Lack of compliance are not complicating management of the primary condition.  There are no active rule-out diagnoses for this patient at this time.     General Impression: Anderson Mcclain is a 28 y.o. male with a psychiatric hx of bipolar disorder presenting with symptoms consistent with bipolar disorder with  psychotic features. Medical hx significant for hepatic steatosis. Family MH hx is significant for substance abuse (father) and suicide (maternal grandfather). Initial diagnosis of bipolar disorder at 23 y/o during 1st hospitalization for liberty/psychosis. Poor response to antipsychotic trials. Positive response to both lithium and Depakote. Hx of 4 psychiatric hospitalizations for liberty/psychosis. No hx of SA, SIB. Hx of violence (physical/verbal aggression) when decompensated. Hx of cannabis dependence. Moved back in with mother in March 2024. Currently unemployed.     07/31/24 -- sx stable, continue Depakote 1,000 mg qHS    09/24/24 -- sx stable, no medication changes made        ICD-10-CM ICD-9-CM   1. Bipolar disorder with psychotic features  F31.9 296.80   2. Anxiety  F41.9 300.00         Intervention/Counseling/Treatment Plan   Reviewed patient's current sx and medication regimen  Continue Depakote as prescribed  Patient inquires about reducing dose due to low energy and lack of motivation  Discussed other contributing factors -- low testosterone, sedentary lifestyle, weight  We agreed to continue medication as prescribed  Encouraged patient follow up with sleep medicine     Medication Management  Prescription drug management was employed during the encounter, as medications were prescribed, or considered but not prescribed.   [facility] reviewed  The risks and benefits of medication were discussed with the patient.  Possible expectable adverse effects of any current or proposed individual psychotropic agents were discussed with this patient.  Counseling was provided on the importance of full compliance with any prescribed medication.  Detailed instructions were provided to the patient regarding the administration of any prescribed medication.  Patient voiced understanding    Return to Clinic:  3 months

## 2024-12-06 ENCOUNTER — PATIENT MESSAGE (OUTPATIENT)
Dept: ENDOCRINOLOGY | Facility: CLINIC | Age: 29
End: 2024-12-06

## 2024-12-06 ENCOUNTER — OFFICE VISIT (OUTPATIENT)
Dept: ENDOCRINOLOGY | Facility: CLINIC | Age: 29
End: 2024-12-06
Payer: MEDICAID

## 2024-12-06 VITALS
SYSTOLIC BLOOD PRESSURE: 145 MMHG | BODY MASS INDEX: 52.74 KG/M2 | DIASTOLIC BLOOD PRESSURE: 99 MMHG | WEIGHT: 315 LBS | HEART RATE: 98 BPM

## 2024-12-06 DIAGNOSIS — G47.33 OSA (OBSTRUCTIVE SLEEP APNEA): ICD-10-CM

## 2024-12-06 DIAGNOSIS — E29.1 SECONDARY MALE HYPOGONADISM: ICD-10-CM

## 2024-12-06 DIAGNOSIS — E66.01 MORBID (SEVERE) OBESITY DUE TO EXCESS CALORIES: Primary | ICD-10-CM

## 2024-12-06 DIAGNOSIS — E66.01 SEVERE OBESITY (BMI >= 40): Chronic | ICD-10-CM

## 2024-12-06 DIAGNOSIS — K76.0 HEPATIC STEATOSIS: ICD-10-CM

## 2024-12-06 PROCEDURE — 99999 PR PBB SHADOW E&M-EST. PATIENT-LVL III: CPT | Mod: PBBFAC,,, | Performed by: INTERNAL MEDICINE

## 2024-12-06 PROCEDURE — 99213 OFFICE O/P EST LOW 20 MIN: CPT | Mod: PBBFAC | Performed by: INTERNAL MEDICINE

## 2024-12-06 RX ORDER — SYRINGE, DISPOSABLE, 3 ML
1 SYRINGE, EMPTY DISPOSABLE MISCELLANEOUS
Qty: 10 EACH | Refills: 11 | Status: SHIPPED | OUTPATIENT
Start: 2024-12-06

## 2024-12-06 RX ORDER — NEEDLES, DISPOSABLE 27GX1/2"
1 NEEDLE, DISPOSABLE MISCELLANEOUS
Qty: 10 EACH | Refills: 11 | Status: SHIPPED | OUTPATIENT
Start: 2024-12-06

## 2024-12-06 RX ORDER — TESTOSTERONE CYPIONATE 200 MG/ML
200 INJECTION, SOLUTION INTRAMUSCULAR
Qty: 10 ML | Refills: 1 | Status: SHIPPED | OUTPATIENT
Start: 2024-12-06 | End: 2025-09-12

## 2024-12-06 NOTE — ASSESSMENT & PLAN NOTE
Suspect obesity is the main cause of low testosterone. He finds it difficult to exercise and stay active. This may improve with TRT.    He is interested in starting GLP-1 agonists for weight loss. Will check 2 hour OGTT to rule out type 2 diabetes. If negative, he understands he would need to pay out of pocket for Zepbound. Discussed side-effects of GLP-1 RA and mitigation strategies.

## 2024-12-06 NOTE — ASSESSMENT & PLAN NOTE
Low suspicion for primary CNS pathology given 2 negative MRIs and other etiologies to explain low T including obesity and NONI. Discussed repeating MRI, but I think it will be low yield.     Effects of obesity on androgen levels discussed with patient    Reviewed recent data on risks and benefits of testosterone therapy.  Studies are equivocal regarding increasing the risk of cardiovascular disease, and that its use should be targeting QOL.     Start TRT with Tcyp 200 mg q14 days. Check repeat labs in 3 months prior to next follow-up. Gave instructions on IM injection. Offered visit with nursing to teach injections, but he politely declined.

## 2024-12-06 NOTE — ASSESSMENT & PLAN NOTE
Discussed NONI as a risk factor for male hypogonadism. He's not interested in resuming CPAP now, but understands that we can refer him back to sleep medicine if he changes his mind on it.

## 2024-12-06 NOTE — PROGRESS NOTES
NEW PATIENT VISIT    Subjective:      Chief Complaint: Male hypogonadism      HPI: Anderson Mcclain is a 29 y.o. male who is here for male hypogonadism      Past Medical History:   Diagnosis Date    Abnormal stress ECG with treadmill 06/29/2021    Asthma     C. 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   Rule Out    Cluster B personality disorder 02/07/2023    Cubital tunnel syndrome on left 01/12/2021    Eczema 04/13/2021    Generalized anxiety disorder 03/22/2021    History of infection of intestine due to Clostridioides difficile 06/28/2016    Hyperlipidemia        With regards to male hypogonadism:    Diagnosed with hypogonadism in 7/2024 based on two low  morning testosterone measurements. He has struggled with chronic fatigue, reduced exercise tolerance and lethargy for a few years, with recent worsening over the past few months. He had testosterone levels drawn through primary care, which were unequivocally low on two occasions. FSH/LH low also c/w secondary hypogonadism.      Ferritin and PRL normal. Iron sat 18% (low).    He had previous MRIs in 2018 and 2021 which did not show any abnormal pituitary findings.     Lab Results   Component Value Date    HCT 42.4 04/01/2024    HCT 43.0 03/21/2022    HCT 42.8 07/29/2021    TOTALTESTOST 55 (L) 07/02/2024    TESTOSTERONE 65 (L) 07/18/2024    TESTOSTERONE 23.6 (L) 07/18/2024    HDL 33 (L) 04/01/2024    HDL 38 (L) 09/26/2022    TRIG 227 (H) 04/01/2024    TRIG 148 09/26/2022    ALT 45 (H) 04/01/2024    ALT 25 05/27/2021    ALT 36 05/17/2021    AST 25 04/01/2024    AST 19 05/27/2021    AST 23 05/17/2021         SYMPTOMS:  Libido?: decreased  AM erections?: no  Erection at time of intercourse?: yes  Maintains erections to ejaculation?: yes  Osteoporosis, height loss or history of fractures?: no  Hot flashes or night sweats?: no      Fertility issues?: no    Sense of smell: Intact  Peripheral vision issues: no  Gynecomastia: yes  Breast tenderness or discharge?: yes  Headaches or blurry vision?: yes headaches; occasional blurry vision  +dizziness and knees weak if he stands too quickly.  Head trauma?: nothing major    Depression?: yes   Excessive daytime sleepiness?: yes    +Obstructive sleep apnea. He previously used CPAP nightly, but did not notice any significant improvement or difference when he stopped using it. He's not interested in resuming at the moment.      RISK FACTORS:  Family history of hypogonadism?: no  Recent severe/critical illness?: no     Chronic corticosteroid or opiate use?: None  Marijuana?: not in the past 2 years  Lavender or tea tree oil?: no  Anabolic steroids?: no  Excessive EtOH?: occasional    No history of diabetes or hyperglycemia of which he is aware. A1c c/w prediabetes.        Objective:     Vitals:    12/06/24 1106   BP: (!) 145/99   Pulse: 98         BP Readings from Last 5 Encounters:   12/06/24 (!) 145/99   09/24/24 133/62   07/31/24 133/80   07/11/24 124/74   06/19/24 (!) 130/58     Physical Exam  Vitals and nursing note reviewed.   Constitutional:       General: He is not in acute distress.     Appearance: He is well-developed. He is obese. He is not ill-appearing.   HENT:      Head: Normocephalic and atraumatic.   Neck:      Thyroid: No thyromegaly.      Trachea: No tracheal deviation.   Pulmonary:      Effort: Pulmonary effort is normal. No respiratory distress.   Neurological:      Mental Status: He is alert and oriented to person, place, and time.      Coordination: Coordination normal.   Psychiatric:         Mood and Affect: Mood normal.         Behavior: Behavior normal.         Wt Readings from Last 20 Encounters:   12/06/24 (!) 162 kg (357 lb 2.3 oz)   09/24/24 (!) 160.3 kg (353 lb 6.4 oz)   07/31/24 (!) 159.6 kg (351 lb 15.4 oz)   07/11/24 (!) 162.6 kg (358 lb 7.5 oz)   06/19/24 (!) 159.8 kg  (352 lb 6.5 oz)   06/04/24 (!) 156.5 kg (345 lb)   03/28/24 (!) 161.5 kg (356 lb 0.7 oz)   02/23/22 (!) 148.8 kg (328 lb)   10/21/21 (!) 144.3 kg (318 lb 0.2 oz)   09/13/21 (!) 139.7 kg (308 lb)   09/07/21 (!) 140 kg (308 lb 11 oz)   07/31/21 (!) 141.9 kg (312 lb 13.3 oz)   07/29/21 (!) 141.7 kg (312 lb 6.3 oz)   06/29/21 (!) 141 kg (310 lb 13.6 oz)   06/23/21 (!) 144 kg (317 lb 7.4 oz)   06/21/21 (!) 140.6 kg (310 lb)   06/21/21 (!) 140.6 kg (310 lb)   05/28/21 (!) 140.7 kg (310 lb 3 oz)   05/27/21 (!) 141.1 kg (311 lb)   05/25/21 (!) 141.3 kg (311 lb 8.2 oz)         Assessment/Plan:     Severe obesity (BMI >= 40)  Suspect obesity is the main cause of low testosterone. He finds it difficult to exercise and stay active. This may improve with TRT.    He is interested in starting GLP-1 agonists for weight loss. Will check 2 hour OGTT to rule out type 2 diabetes. If negative, he understands he would need to pay out of pocket for Zepbound. Discussed side-effects of GLP-1 RA and mitigation strategies.    Hepatic steatosis  Weight loss is the waddell to prevent progression to steatohepatitis/cirrhosis.    NONI (obstructive sleep apnea)  Discussed NONI as a risk factor for male hypogonadism. He's not interested in resuming CPAP now, but understands that we can refer him back to sleep medicine if he changes his mind on it.    Secondary male hypogonadism  Low suspicion for primary CNS pathology given 2 negative MRIs and other etiologies to explain low T including obesity and NONI. Discussed repeating MRI, but I think it will be low yield.     Effects of obesity on androgen levels discussed with patient    Reviewed recent data on risks and benefits of testosterone therapy.  Studies are equivocal regarding increasing the risk of cardiovascular disease, and that its use should be targeting QOL.     Start TRT with Tcyp 200 mg q14 days. Check repeat labs in 3 months prior to next follow-up. Gave instructions on IM injection. Offered  visit with nursing to teach injections, but he politely declined.      Follow up in about 3 months (around 3/6/2025).

## 2024-12-09 ENCOUNTER — LAB VISIT (OUTPATIENT)
Dept: LAB | Facility: HOSPITAL | Age: 29
End: 2024-12-09
Attending: INTERNAL MEDICINE
Payer: MEDICAID

## 2024-12-09 DIAGNOSIS — E66.01 MORBID (SEVERE) OBESITY DUE TO EXCESS CALORIES: ICD-10-CM

## 2024-12-09 LAB
GLUCOSE SERPL-MCNC: 136 MG/DL
GLUCOSE SERPL-MCNC: 184 MG/DL
GLUCOSE SERPL-MCNC: 97 MG/DL (ref 70–110)

## 2024-12-09 PROCEDURE — 82951 GLUCOSE TOLERANCE TEST (GTT): CPT | Performed by: INTERNAL MEDICINE

## 2024-12-09 PROCEDURE — 36415 COLL VENOUS BLD VENIPUNCTURE: CPT | Performed by: INTERNAL MEDICINE

## 2024-12-10 ENCOUNTER — PATIENT MESSAGE (OUTPATIENT)
Dept: ENDOCRINOLOGY | Facility: CLINIC | Age: 29
End: 2024-12-10
Payer: MEDICAID

## 2024-12-10 DIAGNOSIS — E29.1 SECONDARY MALE HYPOGONADISM: ICD-10-CM

## 2024-12-10 DIAGNOSIS — E66.01 MORBID (SEVERE) OBESITY DUE TO EXCESS CALORIES: Primary | ICD-10-CM

## 2024-12-12 RX ORDER — NEEDLES, DISPOSABLE 27GX1/2"
1 NEEDLE, DISPOSABLE MISCELLANEOUS
Qty: 10 EACH | Refills: 11 | Status: SHIPPED | OUTPATIENT
Start: 2024-12-12

## 2024-12-12 RX ORDER — TIRZEPATIDE 2.5 MG/.5ML
2.5 INJECTION, SOLUTION SUBCUTANEOUS
Qty: 2 ML | Refills: 5 | Status: SHIPPED | OUTPATIENT
Start: 2024-12-12

## 2024-12-12 RX ORDER — SYRINGE, DISPOSABLE, 3 ML
1 SYRINGE, EMPTY DISPOSABLE MISCELLANEOUS
Qty: 10 EACH | Refills: 11 | Status: SHIPPED | OUTPATIENT
Start: 2024-12-12

## 2025-01-19 DIAGNOSIS — E29.1 SECONDARY MALE HYPOGONADISM: ICD-10-CM

## 2025-01-22 RX ORDER — TESTOSTERONE CYPIONATE 200 MG/ML
200 INJECTION, SOLUTION INTRAMUSCULAR
Qty: 10 ML | Refills: 1 | Status: SHIPPED | OUTPATIENT
Start: 2025-01-22 | End: 2025-02-03 | Stop reason: SDUPTHER

## 2025-01-23 DIAGNOSIS — E29.1 SECONDARY MALE HYPOGONADISM: ICD-10-CM

## 2025-01-24 RX ORDER — NEEDLES, DISPOSABLE 27GX1/2"
1 NEEDLE, DISPOSABLE MISCELLANEOUS
Qty: 10 EACH | Refills: 11 | Status: SHIPPED | OUTPATIENT
Start: 2025-01-24

## 2025-02-03 DIAGNOSIS — E29.1 SECONDARY MALE HYPOGONADISM: ICD-10-CM

## 2025-02-03 RX ORDER — TESTOSTERONE CYPIONATE 200 MG/ML
200 INJECTION, SOLUTION INTRAMUSCULAR
Qty: 10 ML | Refills: 1 | Status: SHIPPED | OUTPATIENT
Start: 2025-02-03 | End: 2025-11-10

## 2025-02-12 ENCOUNTER — PATIENT MESSAGE (OUTPATIENT)
Dept: ENDOCRINOLOGY | Facility: CLINIC | Age: 30
End: 2025-02-12
Payer: MEDICAID

## 2025-02-20 DIAGNOSIS — E29.1 SECONDARY MALE HYPOGONADISM: ICD-10-CM

## 2025-02-20 RX ORDER — NEEDLES, DISPOSABLE 27GX1/2"
1 NEEDLE, DISPOSABLE MISCELLANEOUS
Qty: 10 EACH | Refills: 11 | Status: SHIPPED | OUTPATIENT
Start: 2025-02-20

## 2025-02-25 ENCOUNTER — RESULTS FOLLOW-UP (OUTPATIENT)
Dept: ENDOCRINOLOGY | Facility: CLINIC | Age: 30
End: 2025-02-25

## 2025-03-07 NOTE — PROGRESS NOTES
NEW PATIENT VISIT    Subjective:      Chief Complaint: Male hypogonadism      HPI: Anderson Mcclain is a 29 y.o. male who is here for male hypogonadism      Past Medical History:   Diagnosis Date    Abnormal stress ECG with treadmill 06/29/2021    Asthma     C. 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   Rule Out    Cluster B personality disorder 02/07/2023    Cubital tunnel syndrome on left 01/12/2021    Eczema 04/13/2021    Generalized anxiety disorder 03/22/2021    History of infection of intestine due to Clostridioides difficile 06/28/2016    Hyperlipidemia        With regards to male hypogonadism:    Diagnosed with hypogonadism in 7/2024 based on two low morning testosterone measurements. He has struggled with chronic fatigue, reduced exercise tolerance and lethargy for a few years, with recent worsening over the past few months. He had testosterone levels drawn through primary care, which were unequivocally low on two occasions. FSH/LH low also c/w secondary hypogonadism.      Ferritin and PRL normal. Iron sat 18% (low).    He had previous MRIs in 2018 and 2021 which did not show any abnormal pituitary findings.     Since last visit, OGTT completed with normal results.  Patient was able to started Zepbound for weight loss at  2.5 mg weekly.  Started testosterone replacement therapy. Repeat labs continued to show low T, however, after further discussion with patient testosterone injections administered inconsistently due to issues getting Rx filled on time.  Patient has since started back on T, but worried he is going to be able to remain consistent with biweekly dosing.  Despite inconsistency reports increased energy.  Patient reports tolerating Zepbound well with no GI side effects.  Has lost about 10-15 lbs.        Lab Results   Component Value Date    HCT 42.2 02/24/2025     HCT 42.4 04/01/2024    HCT 43.0 03/21/2022    TOTALTESTOST 55 (L) 07/02/2024    TESTOSTERONE 155 (L) 02/24/2025    TESTOSTERONE 65 (L) 07/18/2024    TESTOSTERONE 23.6 (L) 07/18/2024    HDL 35 (L) 02/24/2025    HDL 33 (L) 04/01/2024    TRIG 137 02/24/2025    TRIG 227 (H) 04/01/2024    ALT 17 02/24/2025    ALT 45 (H) 04/01/2024    ALT 25 05/27/2021    AST 38 02/24/2025    AST 25 04/01/2024    AST 19 05/27/2021     Medication Management:  Testosterone Cypionate 200 mg IM every 14 days  Zepbound 2.5 mg weekly      SYMPTOMS:  Libido?: decreased  AM erections?: no  Erection at time of intercourse?: yes  Maintains erections to ejaculation?: yes  Osteoporosis, height loss or history of fractures?: no  Hot flashes or night sweats?: no     Fertility issues?: no    Sense of smell: Intact  Peripheral vision issues: no  Gynecomastia: yes  Breast tenderness or discharge?: yes  Headaches or blurry vision?: yes headaches; occasional blurry vision  +dizziness and knees weak if he stands too quickly.  Head trauma?: nothing major    Depression?: yes   Excessive daytime sleepiness?: yes    +Obstructive sleep apnea. He previously used CPAP nightly, but did not notice any significant improvement or difference when he stopped using it. He's not interested in resuming at the moment.    RISK FACTORS:  Family history of hypogonadism?: no  Recent severe/critical illness?: no     Chronic corticosteroid or opiate use?: None  Marijuana?: not in the past 2 years  Lavender or tea tree oil?: no  Anabolic steroids?: no  Excessive EtOH?: occasional    No history of diabetes or hyperglycemia of which he is aware. A1c c/w prediabetes.    Objective:     There were no vitals filed for this visit.      BP Readings from Last 5 Encounters:   12/06/24 (!) 145/99   07/11/24 124/74   03/28/24 130/84   02/23/22 (!) 139/90   10/21/21 135/84     Physical Exam  Vitals and nursing note reviewed.   Constitutional:       General: He is not in acute distress.      Appearance: He is well-developed. He is obese. He is not ill-appearing.   HENT:      Head: Normocephalic and atraumatic.   Neck:      Thyroid: No thyromegaly.      Trachea: No tracheal deviation.   Pulmonary:      Effort: Pulmonary effort is normal. No respiratory distress.   Neurological:      Mental Status: He is alert and oriented to person, place, and time.      Coordination: Coordination normal.   Psychiatric:         Mood and Affect: Mood normal.         Behavior: Behavior normal.       Wt Readings from Last 20 Encounters:   12/06/24 (!) 162 kg (357 lb 2.3 oz)   07/11/24 (!) 162.6 kg (358 lb 7.5 oz)   06/04/24 (!) 156.5 kg (345 lb)   03/28/24 (!) 161.5 kg (356 lb 0.7 oz)   02/23/22 (!) 148.8 kg (328 lb)   10/21/21 (!) 144.3 kg (318 lb 0.2 oz)   09/13/21 (!) 139.7 kg (308 lb)   09/07/21 (!) 140 kg (308 lb 11 oz)   07/31/21 (!) 141.9 kg (312 lb 13.3 oz)   07/29/21 (!) 141.7 kg (312 lb 6.3 oz)   06/29/21 (!) 141 kg (310 lb 13.6 oz)   06/23/21 (!) 144 kg (317 lb 7.4 oz)   06/21/21 (!) 140.6 kg (310 lb)   06/21/21 (!) 140.6 kg (310 lb)   05/28/21 (!) 140.7 kg (310 lb 3 oz)   05/27/21 (!) 141.1 kg (311 lb)   05/25/21 (!) 141.3 kg (311 lb 8.2 oz)   05/17/21 (!) 143.6 kg (316 lb 7.5 oz)   05/17/21 (!) 143.4 kg (316 lb 2.2 oz)   04/03/19 126.3 kg (278 lb 7.1 oz)         Assessment/Plan:     Secondary male hypogonadism  Low suspicion for primary CNS pathology given 2 negative MRIs and other etiologies to explain low T including obesity and NONI.   Patient reports increased energy and feeling better on testosterone therapy.  Reviewed recent data on risks and benefits of testosterone therapy.  Studies are equivocal regarding increasing the risk of cardiovascular disease, and that its use should be targeting QOL.   Continue on testosterone therapy changing to Tcyp 100 mg q7 days for better compliance. Check repeat labs in prior to follow-up in 6 months prior to next follow-up. Gave instructions on IM injection.     Severe  obesity (BMI >= 40)  Suspect obesity is the main cause of low testosterone. He finds it difficult to exercise and stay active. Patient reports increased energy on TRT, however with minimal increased activity at this time.  He is interested in starting GLP-1 agonists for weight loss. OGTT completed and ruled out type 2 diabetes.  Started on Zepbound at 2.5 mg weekly with minimal side effects and weight loss of 10-15lbs.  Increased Zepbound 5 mg weekly today for optimal positive benefit profile.    NONI (obstructive sleep apnea)  Discussed NONI as a risk factor for male hypogonadism. He's not interested in resuming CPAP now, but understands that we can refer him back to sleep medicine if he changes his mind on it.  Will continue to assess for readiness for treatment.      Follow up in about 6 months (around 9/10/2025).    Lisa Lier, FNP-C [facility]     This is a MyChart video visit.    The patient location is: [place]  The chief complaint leading to consultation is: Hypogonadism  Visit type: Virtual visit with synchronous audio and video  Total time spent with patient: 30  Each patient to whom he or she provides medical services by telemedicine is:  (1) informed of the relationship between the physician and patient and the respective role of any other health care provider with respect to management of the patient; and (2) notified that he or she may decline to receive medical services by telemedicine and may withdraw from such care at any time.    Case discussed with Dr. Hua  Recommendations were discussed with the patient in detail  The patient verbalized understanding and agrees with the plan outlined as above.     I spent 25 minutes face-to-face with the patient, over half of the visit was spent on counseling and/or coordinating the care of the patient.    Counseling includes:  Diagnostic results, impressions, recommendations   Prognosis   Risk and benefits of management/treatment options   Instructions  for management treatment and or follow-up   Importance of compliance with management   Risk factor reduction   Patient education

## 2025-03-10 ENCOUNTER — PATIENT MESSAGE (OUTPATIENT)
Dept: ENDOCRINOLOGY | Facility: CLINIC | Age: 30
End: 2025-03-10
Payer: MEDICAID

## 2025-03-10 ENCOUNTER — OFFICE VISIT (OUTPATIENT)
Dept: ENDOCRINOLOGY | Facility: CLINIC | Age: 30
End: 2025-03-10
Payer: MEDICAID

## 2025-03-10 DIAGNOSIS — E29.1 SECONDARY MALE HYPOGONADISM: ICD-10-CM

## 2025-03-10 DIAGNOSIS — K76.0 HEPATIC STEATOSIS: ICD-10-CM

## 2025-03-10 DIAGNOSIS — E66.01 SEVERE OBESITY (BMI >= 40): Primary | ICD-10-CM

## 2025-03-10 DIAGNOSIS — G47.33 OSA (OBSTRUCTIVE SLEEP APNEA): ICD-10-CM

## 2025-03-10 RX ORDER — TESTOSTERONE CYPIONATE 1000 MG/10ML
100 INJECTION, SOLUTION INTRAMUSCULAR
Qty: 4 ML | Refills: 5 | Status: SHIPPED | OUTPATIENT
Start: 2025-03-10

## 2025-03-10 RX ORDER — TIRZEPATIDE 5 MG/.5ML
5 INJECTION, SOLUTION SUBCUTANEOUS
Qty: 6 ML | Refills: 1 | Status: CANCELLED | OUTPATIENT
Start: 2025-03-10

## 2025-03-10 RX ORDER — TIRZEPATIDE 5 MG/.5ML
5 INJECTION, SOLUTION SUBCUTANEOUS
Qty: 2 ML | Refills: 5 | Status: SHIPPED | OUTPATIENT
Start: 2025-03-10

## 2025-03-10 NOTE — ASSESSMENT & PLAN NOTE
Discussed NONI as a risk factor for male hypogonadism. He's not interested in resuming CPAP now, but understands that we can refer him back to sleep medicine if he changes his mind on it.  Will continue to assess for readiness for treatment.

## 2025-03-10 NOTE — ASSESSMENT & PLAN NOTE
Low suspicion for primary CNS pathology given 2 negative MRIs and other etiologies to explain low T including obesity and NONI.   Patient reports increased energy and feeling better on testosterone therapy.  Reviewed recent data on risks and benefits of testosterone therapy.  Studies are equivocal regarding increasing the risk of cardiovascular disease, and that its use should be targeting QOL.   Continue on testosterone therapy changing to Tcyp 100 mg q7 days for better compliance. Check repeat labs in prior to follow-up in 6 months prior to next follow-up. Gave instructions on IM injection.

## 2025-03-10 NOTE — ASSESSMENT & PLAN NOTE
Suspect obesity is the main cause of low testosterone. He finds it difficult to exercise and stay active. Patient reports increased energy on TRT, however with minimal increased activity at this time.  He is interested in starting GLP-1 agonists for weight loss. OGTT completed and ruled out type 2 diabetes.  Started on Zepbound at 2.5 mg weekly with minimal side effects and weight loss of 10-15lbs.  Increased Zepbound 5 mg weekly today for optimal positive benefit profile.

## 2025-03-10 NOTE — Clinical Note
RTC in 6 months with labs prior Orders Placed This Encounter     Testosterone     CBC Auto Differential     Comprehensive Metabolic Panel

## 2025-03-12 NOTE — PROGRESS NOTES
I have reviewed and concur with the NP's history, physical, assessment, and plan.  I supervised and interacted with the resident during the patient examination via real-time, audio/video telecommunications technology, and all questions were answered.    Continue same dose of testosterone.  Will titrate Zepbound to 5 mg to promote additional weight loss.    Vladimir uHa MD  Endocrinology Staff

## 2025-06-28 DIAGNOSIS — F31.9 BIPOLAR DISORDER WITH PSYCHOTIC FEATURES: ICD-10-CM

## 2025-06-30 RX ORDER — DIVALPROEX SODIUM 500 MG/1
1000 TABLET, FILM COATED, EXTENDED RELEASE ORAL NIGHTLY
Qty: 180 TABLET | Refills: 1 | Status: SHIPPED | OUTPATIENT
Start: 2025-06-30

## 2025-07-21 ENCOUNTER — OFFICE VISIT (OUTPATIENT)
Dept: PSYCHIATRY | Facility: CLINIC | Age: 30
End: 2025-07-21
Payer: COMMERCIAL

## 2025-07-21 DIAGNOSIS — F41.9 ANXIETY: ICD-10-CM

## 2025-07-21 DIAGNOSIS — F31.9 BIPOLAR DISORDER WITH PSYCHOTIC FEATURES: ICD-10-CM

## 2025-07-21 DIAGNOSIS — F31.9 BIPOLAR I DISORDER: Primary | ICD-10-CM

## 2025-07-21 PROCEDURE — 1159F MED LIST DOCD IN RCRD: CPT | Mod: CPTII,95,, | Performed by: PHYSICIAN ASSISTANT

## 2025-07-21 PROCEDURE — 1160F RVW MEDS BY RX/DR IN RCRD: CPT | Mod: CPTII,95,, | Performed by: PHYSICIAN ASSISTANT

## 2025-07-21 PROCEDURE — 3044F HG A1C LEVEL LT 7.0%: CPT | Mod: CPTII,95,, | Performed by: PHYSICIAN ASSISTANT

## 2025-07-21 PROCEDURE — 98010 SYNCH AUDIO-ONLY NEW MOD 45: CPT | Mod: 95,,, | Performed by: PHYSICIAN ASSISTANT

## 2025-07-21 PROCEDURE — G2211 COMPLEX E/M VISIT ADD ON: HCPCS | Mod: 95,,, | Performed by: PHYSICIAN ASSISTANT

## 2025-07-21 RX ORDER — DIVALPROEX SODIUM 500 MG/1
1000 TABLET, FILM COATED, EXTENDED RELEASE ORAL NIGHTLY
Qty: 180 TABLET | Refills: 1 | Status: SHIPPED | OUTPATIENT
Start: 2025-07-21

## 2025-07-21 NOTE — PROGRESS NOTES
"The patient location is: At work in [place]  The chief complaint leading to consultation is: Continued care    Visit type: audiovisual    Face to Face time with patient: 10 min  15 minutes of total time spent on the encounter, which includes face to face time and non-face to face time preparing to see the patient (eg, review of tests), Obtaining and/or reviewing separately obtained history, Documenting clinical information in the electronic or other health record, Independently interpreting results (not separately reported) and communicating results to the patient/family/caregiver, or Care coordination (not separately reported).         Each patient to whom he or she provides medical services by telemedicine is:  (1) informed of the relationship between the physician and patient and the respective role of any other health care provider with respect to management of the patient; and (2) notified that he or she may decline to receive medical services by telemedicine and may withdraw from such care at any time.    Notes:     ICD-10-CM ICD-9-CM    1. Bipolar I disorder  F31.9 296.7 Valproic Acid      2. Anxiety  F41.9 300.00       3. Bipolar disorder with psychotic features  F31.9 296.80 divalproex ER (DEPAKOTE ER) 500 MG Tb24 24 hr tablet        Patient ID: Anderson Mcclain is a 29 y.o. male.    Chief Complaint: No chief complaint on file.    History of Present Illness    HPI:  Mr. Mcclain presents for continuation of care after previous psycn NP left [facility].  He reports that their current Depakote regimen has been "fine" and is "most definitely" controlling their mood. They have not had a dose change in quite a while, and it's been some time since their last level check. Mr. Mcclain typically takes Depakote at night.    Mr. Mcclain denies experiencing most common side effects associated with Depakote, such as weight gain or tremor. However, they mention a possible "energy reduction" as a side effect they may be " "experiencing.    Mr. Mcclain reports current weight of about 328 lbs, down from 357 lbs a year ago. They attribute this weight loss to starting a GLP inhibitor, but note that it's still "difficult to lose weight."    Mr. Mcclain describes their condition as "pretty maintenance," which has led to infrequent appointments, with their last appointment being almost a year ago. They affirm they are "definitely still taking" their medication as prescribed, despite a previous low level recorded a year ago.    LIFESTYLE:  Mr. Mcclain is currently employed. No information was provided about his living situation.      ROS:  General: +weight loss, +decreased energy levels, +loss of energy    All other review of systems negative unless otherwise noted in the HPI.         Physical Exam    Appearance: Appears stated age. Well-groomed. Well-nourished.  Speech: Normal rate. Normal volume. Spontaneous and fluid.  Affect: Appropriate.  Thought Content: No evidence of aggression. No evidence of homicidal ideation. No evidence of homicidal plan. No evidence of homicidal intent. No evidence of suicidal ideation. No evidence of suicidal plan. No evidence of suicidal intent. No evidence of delusions.  Memory: Recent memory intact. Remote memory intact.  Behavior: Cooperative. Good eye contact. Engaged. Pleasant.  Mood: Euthymic.  Thought Form: Linear thinking. Goal oriented and directed.  Perception: No perceptual abnormalities noted.  Judgement: Intact as evidenced by decision making in the recent past.  Insight: Good insight into symptoms. Good insight into treatment options.  Cognition: A&Ox3. Normal attention span. Average fund of knowledge.  Motor: No gross motor abnormalities.  LABS:  Mr. Mcclain's Depakote level was checked 1 year ago and found to be low. His liver function tests were reported to be "holding up just fine with the Depakote".    Examination findings limited by telemedicine platform.         Assessment & Plan  "   IMPRESSION:  Assessed current Depakote ER 1000 mg qHS regimen for bipolar disorder management, noting stable mood control with current dosage.  Considered potential side effects of Depakote, including weight gain, tremor, lipid changes, and possible energy reduction.  Evaluated weight loss progress, noting decrease from 357 lbs to 328 lbs.  Determined need for Depakote level check due to extended period since last assessment, reviewing previous low level and deciding to prioritize clinical response over lab values.  Continued Depakote at current dose for bipolar disorder management.        This note was generated with the assistance of ambient listening technology. Verbal consent was obtained by the patient and accompanying visitor(s) for the recording of patient appointment to facilitate this note. I attest to having reviewed and edited the generated note for accuracy, though some syntax or spelling errors may persist. Please contact the author of this note for any clarification.          ICD-10-CM ICD-9-CM    1. Bipolar I disorder  F31.9 296.7 Valproic Acid      2. Anxiety  F41.9 300.00       3. Bipolar disorder with psychotic features  F31.9 296.80 divalproex ER (DEPAKOTE ER) 500 MG Tb24 24 hr tablet             Visit today included increased complexity associated with the care of the episodic problem Depakote management addressed and managing the longitudinal care of the patient due to the serious and/or complex managed problem(s) diagnoses as listed above.

## 2025-08-15 ENCOUNTER — PATIENT MESSAGE (OUTPATIENT)
Dept: PSYCHIATRY | Facility: CLINIC | Age: 30
End: 2025-08-15
Payer: COMMERCIAL

## 2025-08-27 ENCOUNTER — PATIENT MESSAGE (OUTPATIENT)
Dept: ENDOCRINOLOGY | Facility: CLINIC | Age: 30
End: 2025-08-27
Payer: COMMERCIAL

## 2025-08-27 ENCOUNTER — PATIENT MESSAGE (OUTPATIENT)
Dept: PSYCHIATRY | Facility: HOSPITAL | Age: 30
End: 2025-08-27
Payer: COMMERCIAL

## 2025-09-03 ENCOUNTER — OFFICE VISIT (OUTPATIENT)
Dept: ENDOCRINOLOGY | Facility: CLINIC | Age: 30
End: 2025-09-03
Payer: COMMERCIAL

## 2025-09-03 DIAGNOSIS — K76.0 HEPATIC STEATOSIS: ICD-10-CM

## 2025-09-03 DIAGNOSIS — E29.1 SECONDARY MALE HYPOGONADISM: Primary | ICD-10-CM

## 2025-09-03 DIAGNOSIS — G47.33 OSA (OBSTRUCTIVE SLEEP APNEA): ICD-10-CM

## 2025-09-03 DIAGNOSIS — E66.01 SEVERE OBESITY (BMI >= 40): Chronic | ICD-10-CM

## 2025-09-03 PROCEDURE — 3044F HG A1C LEVEL LT 7.0%: CPT | Mod: CPTII,95,, | Performed by: INTERNAL MEDICINE

## 2025-09-03 PROCEDURE — G2211 COMPLEX E/M VISIT ADD ON: HCPCS | Mod: 95,,, | Performed by: INTERNAL MEDICINE

## 2025-09-03 PROCEDURE — 98006 SYNCH AUDIO-VIDEO EST MOD 30: CPT | Mod: 95,,, | Performed by: INTERNAL MEDICINE

## 2025-09-03 RX ORDER — TESTOSTERONE CYPIONATE 1000 MG/10ML
160 INJECTION, SOLUTION INTRAMUSCULAR
Qty: 20 ML | Refills: 1 | Status: SHIPPED | OUTPATIENT
Start: 2025-09-03